# Patient Record
Sex: MALE | Race: WHITE | NOT HISPANIC OR LATINO | Employment: OTHER | RURAL
[De-identification: names, ages, dates, MRNs, and addresses within clinical notes are randomized per-mention and may not be internally consistent; named-entity substitution may affect disease eponyms.]

---

## 2021-11-18 ENCOUNTER — OFFICE VISIT (OUTPATIENT)
Dept: OTOLARYNGOLOGY | Facility: CLINIC | Age: 66
End: 2021-11-18
Payer: MEDICARE

## 2021-11-18 ENCOUNTER — CLINICAL SUPPORT (OUTPATIENT)
Dept: AUDIOLOGY | Facility: CLINIC | Age: 66
End: 2021-11-18
Payer: MEDICARE

## 2021-11-18 VITALS — BODY MASS INDEX: 23.8 KG/M2 | WEIGHT: 170 LBS | HEIGHT: 71 IN

## 2021-11-18 DIAGNOSIS — H90.3 SENSORINEURAL HEARING LOSS (SNHL) OF BOTH EARS: ICD-10-CM

## 2021-11-18 DIAGNOSIS — H65.22 LEFT CHRONIC SEROUS OTITIS MEDIA: ICD-10-CM

## 2021-11-18 DIAGNOSIS — H93.293 ABNORMAL AUDITORY PERCEPTION OF BOTH EARS: Primary | ICD-10-CM

## 2021-11-18 DIAGNOSIS — H61.23 BILATERAL IMPACTED CERUMEN: ICD-10-CM

## 2021-11-18 DIAGNOSIS — H66.93 BILATERAL OTITIS MEDIA, UNSPECIFIED OTITIS MEDIA TYPE: Primary | ICD-10-CM

## 2021-11-18 DIAGNOSIS — H90.72 MIXED CONDUCTIVE AND SENSORINEURAL HEARING LOSS OF LEFT EAR, UNSPECIFIED HEARING STATUS ON CONTRALATERAL SIDE: ICD-10-CM

## 2021-11-18 PROCEDURE — 99204 OFFICE O/P NEW MOD 45 MIN: CPT | Mod: S$PBB,25,, | Performed by: OTOLARYNGOLOGY

## 2021-11-18 PROCEDURE — 99213 OFFICE O/P EST LOW 20 MIN: CPT | Mod: PBBFAC,27 | Performed by: OTOLARYNGOLOGY

## 2021-11-18 PROCEDURE — 99204 PR OFFICE/OUTPT VISIT, NEW, LEVL IV, 45-59 MIN: ICD-10-PCS | Mod: S$PBB,25,, | Performed by: OTOLARYNGOLOGY

## 2021-11-18 PROCEDURE — 69210 REMOVE IMPACTED EAR WAX UNI: CPT | Mod: S$PBB,,, | Performed by: OTOLARYNGOLOGY

## 2021-11-18 PROCEDURE — 69210 REMOVE IMPACTED EAR WAX UNI: CPT | Mod: PBBFAC | Performed by: OTOLARYNGOLOGY

## 2021-11-18 PROCEDURE — 99202 OFFICE O/P NEW SF 15 MIN: CPT | Mod: PBBFAC | Performed by: AUDIOLOGIST

## 2021-11-18 PROCEDURE — 92557 COMPREHENSIVE HEARING TEST: CPT | Mod: 52,PBBFAC | Performed by: AUDIOLOGIST

## 2021-11-18 PROCEDURE — 69210 PR REMOVAL IMPACTED CERUMEN REQUIRING INSTRUMENTATION, UNILATERAL: ICD-10-PCS | Mod: S$PBB,,, | Performed by: OTOLARYNGOLOGY

## 2021-11-18 RX ORDER — AMOXICILLIN AND CLAVULANATE POTASSIUM 500; 125 MG/1; MG/1
1 TABLET, FILM COATED ORAL 2 TIMES DAILY
Qty: 28 TABLET | Refills: 0 | Status: ON HOLD | OUTPATIENT
Start: 2021-11-18 | End: 2022-06-17 | Stop reason: ALTCHOICE

## 2022-04-09 ENCOUNTER — OFFICE VISIT (OUTPATIENT)
Dept: FAMILY MEDICINE | Facility: CLINIC | Age: 67
End: 2022-04-09
Payer: MEDICARE

## 2022-04-09 VITALS
RESPIRATION RATE: 20 BRPM | SYSTOLIC BLOOD PRESSURE: 150 MMHG | TEMPERATURE: 99 F | BODY MASS INDEX: 36 KG/M2 | DIASTOLIC BLOOD PRESSURE: 69 MMHG | HEIGHT: 66 IN | HEART RATE: 85 BPM | OXYGEN SATURATION: 97 % | WEIGHT: 224 LBS

## 2022-04-09 DIAGNOSIS — H65.02 NON-RECURRENT ACUTE SEROUS OTITIS MEDIA OF LEFT EAR: Primary | ICD-10-CM

## 2022-04-09 PROCEDURE — 99203 PR OFFICE/OUTPT VISIT, NEW, LEVL III, 30-44 MIN: ICD-10-PCS | Mod: ,,, | Performed by: FAMILY MEDICINE

## 2022-04-09 PROCEDURE — 99203 OFFICE O/P NEW LOW 30 MIN: CPT | Mod: ,,, | Performed by: FAMILY MEDICINE

## 2022-04-09 RX ORDER — AMOXICILLIN 875 MG/1
875 TABLET, FILM COATED ORAL EVERY 12 HOURS
Qty: 20 TABLET | Refills: 0 | Status: ON HOLD | OUTPATIENT
Start: 2022-04-09 | End: 2022-06-17 | Stop reason: ALTCHOICE

## 2022-04-09 RX ORDER — INSULIN ASPART 100 [IU]/ML
36 INJECTION, SOLUTION INTRAVENOUS; SUBCUTANEOUS DAILY PRN
Status: ON HOLD | COMMUNITY
Start: 2021-05-03 | End: 2022-06-17

## 2022-04-09 RX ORDER — AMOXICILLIN 875 MG/1
875 TABLET, FILM COATED ORAL EVERY 12 HOURS
Qty: 20 TABLET | Refills: 0 | Status: CANCELLED | OUTPATIENT
Start: 2022-04-09

## 2022-04-09 RX ORDER — MONTELUKAST SODIUM 10 MG/1
10 TABLET ORAL DAILY
COMMUNITY
Start: 2022-01-13

## 2022-04-09 RX ORDER — INSULIN ASPART 100 [IU]/ML
INJECTION, SOLUTION INTRAVENOUS; SUBCUTANEOUS 3 TIMES DAILY
COMMUNITY
Start: 2022-03-13

## 2022-04-09 RX ORDER — INSULIN DEGLUDEC 200 U/ML
INJECTION, SOLUTION SUBCUTANEOUS
COMMUNITY
Start: 2022-02-26

## 2022-04-09 RX ORDER — PITAVASTATIN CALCIUM 4.18 MG/1
TABLET, FILM COATED ORAL
COMMUNITY

## 2022-04-09 RX ORDER — PREDNISONE 20 MG/1
TABLET ORAL
Qty: 10 TABLET | Refills: 0 | Status: CANCELLED | OUTPATIENT
Start: 2022-04-09

## 2022-04-09 RX ORDER — CLONIDINE HYDROCHLORIDE 0.2 MG/1
0.2 TABLET ORAL 2 TIMES DAILY PRN
COMMUNITY
Start: 2021-12-30

## 2022-04-09 RX ORDER — ALBUTEROL SULFATE 1.25 MG/3ML
SOLUTION RESPIRATORY (INHALATION)
COMMUNITY
Start: 2021-11-12 | End: 2024-01-13 | Stop reason: CLARIF

## 2022-04-09 RX ORDER — GABAPENTIN 300 MG/1
300 CAPSULE ORAL 2 TIMES DAILY
COMMUNITY
Start: 2022-03-13

## 2022-04-09 RX ORDER — BLOOD SUGAR DIAGNOSTIC
STRIP MISCELLANEOUS
COMMUNITY
Start: 2022-01-03

## 2022-04-09 RX ORDER — HYDRALAZINE HYDROCHLORIDE 50 MG/1
TABLET, FILM COATED ORAL
COMMUNITY

## 2022-04-09 RX ORDER — PREDNISONE 20 MG/1
TABLET ORAL
Qty: 10 TABLET | Refills: 0 | Status: ON HOLD | OUTPATIENT
Start: 2022-04-09 | End: 2022-06-17 | Stop reason: ALTCHOICE

## 2022-04-09 RX ORDER — CARVEDILOL 25 MG/1
25 TABLET ORAL 2 TIMES DAILY
COMMUNITY
Start: 2021-12-02

## 2022-04-09 RX ORDER — CLOPIDOGREL BISULFATE 75 MG/1
75 TABLET ORAL DAILY
COMMUNITY
Start: 2022-03-13

## 2022-04-09 RX ORDER — NITROGLYCERIN 0.4 MG/1
TABLET SUBLINGUAL
COMMUNITY

## 2022-04-09 NOTE — PROGRESS NOTES
Subjective:       Patient ID: Isauro Spivey is a 66 y.o. male.    Chief Complaint: Sinus Problem (Sinus congestion, sore throat, ear discomfort)    No fever.  Left ear feels stopped up.    Review of Systems      Objective:      Physical Exam  Constitutional:       Appearance: Normal appearance. He is not ill-appearing.   HENT:      Right Ear: Tympanic membrane normal.      Left Ear: A middle ear effusion is present. Tympanic membrane is bulging. Tympanic membrane is not injected.      Nose: Congestion and rhinorrhea present.      Mouth/Throat:      Pharynx: No posterior oropharyngeal erythema.   Cardiovascular:      Rate and Rhythm: Normal rate and regular rhythm.   Pulmonary:      Effort: Pulmonary effort is normal.      Breath sounds: Normal breath sounds.   Neurological:      Mental Status: He is alert.         Assessment:       Problem List Items Addressed This Visit    None     Visit Diagnoses     Non-recurrent acute serous otitis media of left ear    -  Primary          Plan:         better in 2 days

## 2022-06-09 ENCOUNTER — TELEPHONE (OUTPATIENT)
Dept: OTOLARYNGOLOGY | Facility: CLINIC | Age: 67
End: 2022-06-09
Payer: MEDICARE

## 2022-06-09 ENCOUNTER — OFFICE VISIT (OUTPATIENT)
Dept: OTOLARYNGOLOGY | Facility: CLINIC | Age: 67
End: 2022-06-09
Payer: MEDICARE

## 2022-06-09 VITALS — HEIGHT: 66 IN | WEIGHT: 224 LBS | BODY MASS INDEX: 36 KG/M2

## 2022-06-09 DIAGNOSIS — H65.21 RIGHT CHRONIC SEROUS OTITIS MEDIA: ICD-10-CM

## 2022-06-09 DIAGNOSIS — H90.3 SENSORINEURAL HEARING LOSS (SNHL) OF BOTH EARS: Primary | ICD-10-CM

## 2022-06-09 PROCEDURE — 99214 OFFICE O/P EST MOD 30 MIN: CPT | Mod: S$PBB,,, | Performed by: OTOLARYNGOLOGY

## 2022-06-09 PROCEDURE — 99215 OFFICE O/P EST HI 40 MIN: CPT | Mod: PBBFAC | Performed by: OTOLARYNGOLOGY

## 2022-06-09 PROCEDURE — 99214 PR OFFICE/OUTPT VISIT, EST, LEVL IV, 30-39 MIN: ICD-10-PCS | Mod: S$PBB,,, | Performed by: OTOLARYNGOLOGY

## 2022-06-09 NOTE — H&P (VIEW-ONLY)
Subjective:       Patient ID: Isauro Spivey is a 66 y.o. male.    Chief Complaint: Ear Fullness (Right ear fullness. Pt states right ear opened up and then feels like it closed again, states he has decreased hearing in right ear. ) and Hearing Loss (Decreased hearing. )  Fluid for > 3 months  HPI  Review of Systems   HENT: Positive for ear pain and hearing loss.    All other systems reviewed and are negative.      Objective:      Physical Exam  General: NAD  Head: Normocephalic, atraumatic, no facial asymmetry/normal strength,  Ears: Both auricules normal in appearance, w/o deformities tympanic membranes fluid right  external auditory canals normal  Nose: External nose w/o deformities normal turbinates no drainage or inflammation  Oral Cavity: Lips, gums, floor of mouth, tongue hard palate, and buccal mucosa without mass/lesion  Oropharynx: Mucosa pink and moist, soft palate, posterior pharynx and oropharyngeal wall without mass/lesion  Neck: Supple, symmetric, trachea midline, no palpable mass/lesion, no palpable cervical lymphadenopathy  Skin: Warm and dry, no concerning lesions  Respiratory: Respirations even, unlabored    Assessment:       1. Sensorineural hearing loss (SNHL) of both ears    2. Right chronic serous otitis media        Plan:       Right tube in OR

## 2022-06-17 ENCOUNTER — ANESTHESIA (OUTPATIENT)
Dept: SURGERY | Facility: HOSPITAL | Age: 67
End: 2022-06-17
Payer: MEDICARE

## 2022-06-17 ENCOUNTER — ANESTHESIA EVENT (OUTPATIENT)
Dept: SURGERY | Facility: HOSPITAL | Age: 67
End: 2022-06-17
Payer: MEDICARE

## 2022-06-17 ENCOUNTER — HOSPITAL ENCOUNTER (OUTPATIENT)
Facility: HOSPITAL | Age: 67
Discharge: HOME | End: 2022-06-17
Attending: OTOLARYNGOLOGY | Admitting: OTOLARYNGOLOGY
Payer: MEDICARE

## 2022-06-17 VITALS
HEART RATE: 68 BPM | HEIGHT: 69 IN | WEIGHT: 232 LBS | DIASTOLIC BLOOD PRESSURE: 68 MMHG | OXYGEN SATURATION: 97 % | RESPIRATION RATE: 17 BRPM | TEMPERATURE: 98 F | BODY MASS INDEX: 34.36 KG/M2 | SYSTOLIC BLOOD PRESSURE: 161 MMHG

## 2022-06-17 DIAGNOSIS — H65.21 RIGHT CHRONIC SEROUS OTITIS MEDIA: Primary | ICD-10-CM

## 2022-06-17 DIAGNOSIS — H66.91 CHRONIC OTITIS MEDIA OF RIGHT EAR: ICD-10-CM

## 2022-06-17 LAB
GLUCOSE SERPL-MCNC: 169 MG/DL (ref 70–105)
GLUCOSE SERPL-MCNC: 214 MG/DL (ref 70–105)

## 2022-06-17 PROCEDURE — 71000015 HC POSTOP RECOV 1ST HR: Performed by: OTOLARYNGOLOGY

## 2022-06-17 PROCEDURE — D9220A PRA ANESTHESIA: ICD-10-PCS | Mod: ANES,,, | Performed by: ANESTHESIOLOGY

## 2022-06-17 PROCEDURE — D9220A PRA ANESTHESIA: ICD-10-PCS | Mod: CRNA,,, | Performed by: NURSE ANESTHETIST, CERTIFIED REGISTERED

## 2022-06-17 PROCEDURE — 37000009 HC ANESTHESIA EA ADD 15 MINS: Performed by: OTOLARYNGOLOGY

## 2022-06-17 PROCEDURE — 27000510 HC BLANKET BAIR HUGGER ANY SIZE: Performed by: ANESTHESIOLOGY

## 2022-06-17 PROCEDURE — D9220A PRA ANESTHESIA: Mod: ANES,,, | Performed by: ANESTHESIOLOGY

## 2022-06-17 PROCEDURE — 37000008 HC ANESTHESIA 1ST 15 MINUTES: Performed by: OTOLARYNGOLOGY

## 2022-06-17 PROCEDURE — 27000177 HC AIRWAY, LARYNGEAL MASK: Performed by: ANESTHESIOLOGY

## 2022-06-17 PROCEDURE — 63600175 PHARM REV CODE 636 W HCPCS: Performed by: NURSE ANESTHETIST, CERTIFIED REGISTERED

## 2022-06-17 PROCEDURE — 27800903 OPTIME MED/SURG SUP & DEVICES OTHER IMPLANTS: Performed by: OTOLARYNGOLOGY

## 2022-06-17 PROCEDURE — 27000716 HC OXISENSOR PROBE, ANY SIZE: Performed by: ANESTHESIOLOGY

## 2022-06-17 PROCEDURE — 27000655: Performed by: ANESTHESIOLOGY

## 2022-06-17 PROCEDURE — 82962 GLUCOSE BLOOD TEST: CPT | Mod: 91

## 2022-06-17 PROCEDURE — 25000003 PHARM REV CODE 250: Performed by: NURSE ANESTHETIST, CERTIFIED REGISTERED

## 2022-06-17 PROCEDURE — 71000033 HC RECOVERY, INTIAL HOUR: Performed by: OTOLARYNGOLOGY

## 2022-06-17 PROCEDURE — 69436 CREATE EARDRUM OPENING: CPT | Mod: RT,,, | Performed by: OTOLARYNGOLOGY

## 2022-06-17 PROCEDURE — 36000704 HC OR TIME LEV I 1ST 15 MIN: Performed by: OTOLARYNGOLOGY

## 2022-06-17 PROCEDURE — D9220A PRA ANESTHESIA: Mod: CRNA,,, | Performed by: NURSE ANESTHETIST, CERTIFIED REGISTERED

## 2022-06-17 PROCEDURE — 27201423 OPTIME MED/SURG SUP & DEVICES STERILE SUPPLY: Performed by: OTOLARYNGOLOGY

## 2022-06-17 PROCEDURE — 27000284 HC CANNULA NASAL: Performed by: ANESTHESIOLOGY

## 2022-06-17 PROCEDURE — 69436 PR CREATE EARDRUM OPENING,GEN ANESTH: ICD-10-PCS | Mod: RT,,, | Performed by: OTOLARYNGOLOGY

## 2022-06-17 RX ORDER — LIDOCAINE HYDROCHLORIDE 20 MG/ML
INJECTION, SOLUTION EPIDURAL; INFILTRATION; INTRACAUDAL; PERINEURAL
Status: DISCONTINUED | OUTPATIENT
Start: 2022-06-17 | End: 2022-06-17

## 2022-06-17 RX ORDER — OXYCODONE HYDROCHLORIDE 5 MG/1
5 TABLET ORAL
Status: CANCELLED | OUTPATIENT
Start: 2022-06-17

## 2022-06-17 RX ORDER — SODIUM CHLORIDE, SODIUM LACTATE, POTASSIUM CHLORIDE, CALCIUM CHLORIDE 600; 310; 30; 20 MG/100ML; MG/100ML; MG/100ML; MG/100ML
125 INJECTION, SOLUTION INTRAVENOUS CONTINUOUS
Status: CANCELLED | OUTPATIENT
Start: 2022-06-17

## 2022-06-17 RX ORDER — ONDANSETRON 2 MG/ML
INJECTION INTRAMUSCULAR; INTRAVENOUS
Status: DISCONTINUED | OUTPATIENT
Start: 2022-06-17 | End: 2022-06-17

## 2022-06-17 RX ORDER — MIDAZOLAM HYDROCHLORIDE 5 MG/ML
INJECTION INTRAMUSCULAR; INTRAVENOUS
Status: DISCONTINUED | OUTPATIENT
Start: 2022-06-17 | End: 2022-06-17

## 2022-06-17 RX ORDER — ONDANSETRON 2 MG/ML
4 INJECTION INTRAMUSCULAR; INTRAVENOUS DAILY PRN
Status: CANCELLED | OUTPATIENT
Start: 2022-06-17

## 2022-06-17 RX ORDER — HYDROMORPHONE HYDROCHLORIDE 2 MG/ML
0.5 INJECTION, SOLUTION INTRAMUSCULAR; INTRAVENOUS; SUBCUTANEOUS EVERY 5 MIN PRN
Status: CANCELLED | OUTPATIENT
Start: 2022-06-17

## 2022-06-17 RX ORDER — DIPHENHYDRAMINE HYDROCHLORIDE 50 MG/ML
25 INJECTION INTRAMUSCULAR; INTRAVENOUS EVERY 6 HOURS PRN
Status: CANCELLED | OUTPATIENT
Start: 2022-06-17

## 2022-06-17 RX ORDER — MORPHINE SULFATE 8 MG/ML
4 INJECTION INTRAMUSCULAR; INTRAVENOUS; SUBCUTANEOUS EVERY 5 MIN PRN
Status: CANCELLED | OUTPATIENT
Start: 2022-06-17

## 2022-06-17 RX ORDER — PROPOFOL 10 MG/ML
VIAL (ML) INTRAVENOUS
Status: DISCONTINUED | OUTPATIENT
Start: 2022-06-17 | End: 2022-06-17

## 2022-06-17 RX ADMIN — SODIUM CHLORIDE, POTASSIUM CHLORIDE, SODIUM LACTATE AND CALCIUM CHLORIDE: 600; 310; 30; 20 INJECTION, SOLUTION INTRAVENOUS at 10:06

## 2022-06-17 RX ADMIN — ONDANSETRON 8 MG: 2 INJECTION INTRAMUSCULAR; INTRAVENOUS at 10:06

## 2022-06-17 RX ADMIN — MIDAZOLAM HYDROCHLORIDE 2 MG: 5 INJECTION, SOLUTION INTRAMUSCULAR; INTRAVENOUS at 10:06

## 2022-06-17 RX ADMIN — PROPOFOL 150 MG: 10 INJECTION, EMULSION INTRAVENOUS at 10:06

## 2022-06-17 RX ADMIN — LIDOCAINE HYDROCHLORIDE 100 MG: 20 INJECTION, SOLUTION EPIDURAL; INFILTRATION; INTRACAUDAL; PERINEURAL at 10:06

## 2022-06-17 NOTE — ANESTHESIA PREPROCEDURE EVALUATION
06/17/2022  Isauro Spivey is a 66 y.o., male.      Pre-op Assessment    I have reviewed the Patient Summary Reports.     I have reviewed the Nursing Notes. I have reviewed the NPO Status.   I have reviewed the Medications.     Review of Systems  Anesthesia Hx:  No problems with previous Anesthesia    Social:  Non-Smoker, No Alcohol Use    Hematology/Oncology:  Hematology Normal   Oncology Normal   Hematology Comments: Anticoagulated s/p stent    EENT/Dental:EENT/Dental Normal   Cardiovascular:   Hypertension Past MI CAD   Hx MI / CORONARY STENT (stable)   Pulmonary:  Pulmonary Normal    Renal/:  Renal/ Normal     Hepatic/GI:  Hepatic/GI Normal    Musculoskeletal:  Musculoskeletal Normal    Neurological:  Neurology Normal    Endocrine:   Diabetes, well controlled, type 2  Obesity / BMI > 30  Dermatological:  Skin Normal    Psych:  Psychiatric Normal           Physical Exam  General: Well nourished    Airway:  Mallampati: III / III  Mouth Opening: Normal  TM Distance: > 6 cm  Tongue: Normal  Neck ROM: Normal ROM    Chest/Lungs:  Clear to auscultation, Normal Respiratory Rate    Heart:  Rate: Normal  Rhythm: Regular Rhythm        Anesthesia Plan  Type of Anesthesia, risks & benefits discussed:    Anesthesia Type: Gen Supraglottic Airway  Intra-op Monitoring Plan: Standard ASA Monitors  Post Op Pain Control Plan: multimodal analgesia  Induction:  IV  Informed Consent: Informed consent signed with the Patient and all parties understand the risks and agree with anesthesia plan.  All questions answered.   ASA Score: 4  Day of Surgery Review of History & Physical: H&P Update referred to the surgeon/provider.I have interviewed and examined the patient. I have reviewed the patient's H&P dated: There are no significant changes. H&P completed by Anesthesiologist.    Ready For Surgery From Anesthesia Perspective.      .

## 2022-06-17 NOTE — TRANSFER OF CARE
"Anesthesia Transfer of Care Note    Patient: Isauro Spivey    Procedure(s) Performed: Procedure(s) (LRB):  RIGHT MYRINGOTOMY, WITH TYMPANOSTOMY TUBE INSERTION (Right)    Patient location: PACU    Anesthesia Type: general    Transport from OR: Transported from OR on 100% O2 by closed face mask with adequate spontaneous ventilation    Post pain: adequate analgesia    Post assessment: no apparent anesthetic complications    Post vital signs: stable    Level of consciousness: responds to stimulation    Nausea/Vomiting: no nausea/vomiting    Complications: none    Transfer of care protocol was followedComments: Accu check-169      Last vitals:   Visit Vitals  BP (!) 124/53 (BP Location: Left arm, Patient Position: Lying)   Pulse 70   Temp 36.7 °C (98.1 °F) (Oral)   Resp (!) 22   Ht 5' 9" (1.753 m)   Wt 105.2 kg (232 lb)   SpO2 100%   BMI 34.26 kg/m²     "

## 2022-06-17 NOTE — OP NOTE
Pre-Op Diagnosis :Chronic recurrent serous otitis media    Post op Diagnosis: Same     After general mask anesthesia using the operating microscope the right ear was examined and a myringotomy was performed in the anterior inferior quadrant and a  tube was placed without difficulty excess middle ear  fluid was suctioned. the patient tolerated the procedure well and was reversed taken to RR in stable condition

## 2022-06-28 ENCOUNTER — OFFICE VISIT (OUTPATIENT)
Dept: OTOLARYNGOLOGY | Facility: CLINIC | Age: 67
End: 2022-06-28
Payer: MEDICARE

## 2022-06-28 VITALS — BODY MASS INDEX: 34.36 KG/M2 | WEIGHT: 232 LBS | HEIGHT: 69 IN

## 2022-06-28 DIAGNOSIS — H92.11 OTORRHEA, RIGHT EAR: ICD-10-CM

## 2022-06-28 DIAGNOSIS — H65.21 RIGHT CHRONIC SEROUS OTITIS MEDIA: ICD-10-CM

## 2022-06-28 DIAGNOSIS — H66.91 RIGHT OTITIS MEDIA, UNSPECIFIED OTITIS MEDIA TYPE: Primary | ICD-10-CM

## 2022-06-28 DIAGNOSIS — H90.3 SENSORINEURAL HEARING LOSS (SNHL) OF BOTH EARS: ICD-10-CM

## 2022-06-28 PROCEDURE — 99213 OFFICE O/P EST LOW 20 MIN: CPT | Mod: S$PBB,,, | Performed by: OTOLARYNGOLOGY

## 2022-06-28 PROCEDURE — 99213 OFFICE O/P EST LOW 20 MIN: CPT | Mod: PBBFAC | Performed by: OTOLARYNGOLOGY

## 2022-06-28 PROCEDURE — 99213 PR OFFICE/OUTPT VISIT, EST, LEVL III, 20-29 MIN: ICD-10-PCS | Mod: S$PBB,,, | Performed by: OTOLARYNGOLOGY

## 2022-06-28 RX ORDER — CIPROFLOXACIN AND DEXAMETHASONE 3; 1 MG/ML; MG/ML
3 SUSPENSION/ DROPS AURICULAR (OTIC) 2 TIMES DAILY
Qty: 7.5 ML | Refills: 0 | Status: SHIPPED | OUTPATIENT
Start: 2022-06-28 | End: 2022-08-31 | Stop reason: ALTCHOICE

## 2022-06-28 NOTE — PROGRESS NOTES
Subjective:       Patient ID: Isauro Spivey is a 66 y.o. male.    Chief Complaint: Follow-up (Patient is a follow up for a right pe tube. He states he has has constant drainage and it has a smell to it.)    HPI  Review of Systems   HENT: Positive for ear discharge and ear pain.    All other systems reviewed and are negative.      Objective:      Physical Exam  General: NAD  Head: Normocephalic, atraumatic, no facial asymmetry/normal strength,  Ears: Both auricules normal in appearance, w/o deformities tympanic membranes tube open on right clear drainage external auditory canals normal  Nose: External nose w/o deformities normal turbinates no drainage or inflammation  Oral Cavity: Lips, gums, floor of mouth, tongue hard palate, and buccal mucosa without mass/lesion  Oropharynx: Mucosa pink and moist, soft palate, posterior pharynx and oropharyngeal wall without mass/lesion  Neck: Supple, symmetric, trachea midline, no palpable mass/lesion, no palpable cervical lymphadenopathy  Skin: Warm and dry, no concerning lesions  Respiratory: Respirations even, unlabored    Assessment:       1. Right otitis media, unspecified otitis media type    2. Sensorineural hearing loss (SNHL) of both ears    3. Right chronic serous otitis media    4. Otorrhea, right ear        Plan:       Ciprodex

## 2022-07-18 ENCOUNTER — TELEPHONE (OUTPATIENT)
Dept: OTOLARYNGOLOGY | Facility: CLINIC | Age: 67
End: 2022-07-18
Payer: MEDICARE

## 2022-07-18 DIAGNOSIS — H66.91 CHRONIC OTITIS MEDIA OF RIGHT EAR: Primary | ICD-10-CM

## 2022-07-18 RX ORDER — CLINDAMYCIN HYDROCHLORIDE 300 MG/1
300 CAPSULE ORAL 2 TIMES DAILY
Qty: 28 CAPSULE | Refills: 0 | Status: SHIPPED | OUTPATIENT
Start: 2022-07-18 | End: 2022-08-31 | Stop reason: ALTCHOICE

## 2022-08-01 ENCOUNTER — TELEPHONE (OUTPATIENT)
Dept: OTOLARYNGOLOGY | Facility: CLINIC | Age: 67
End: 2022-08-01
Payer: MEDICARE

## 2022-08-01 DIAGNOSIS — H66.93 BILATERAL OTITIS MEDIA, UNSPECIFIED OTITIS MEDIA TYPE: Primary | ICD-10-CM

## 2022-08-01 RX ORDER — OFLOXACIN 3 MG/ML
3 SOLUTION AURICULAR (OTIC) 2 TIMES DAILY
Qty: 10 ML | Refills: 0 | Status: SHIPPED | OUTPATIENT
Start: 2022-08-01 | End: 2022-08-31 | Stop reason: ALTCHOICE

## 2022-08-01 RX ORDER — LEVOFLOXACIN 500 MG/1
500 TABLET, FILM COATED ORAL DAILY
Qty: 10 TABLET | Refills: 0 | Status: SHIPPED | OUTPATIENT
Start: 2022-08-01 | End: 2022-10-21

## 2022-08-31 ENCOUNTER — OFFICE VISIT (OUTPATIENT)
Dept: OTOLARYNGOLOGY | Facility: CLINIC | Age: 67
End: 2022-08-31
Payer: MEDICARE

## 2022-08-31 VITALS — HEIGHT: 69 IN | WEIGHT: 232 LBS | BODY MASS INDEX: 34.36 KG/M2

## 2022-08-31 DIAGNOSIS — H60.91 OTITIS EXTERNA OF RIGHT EAR, UNSPECIFIED CHRONICITY, UNSPECIFIED TYPE: ICD-10-CM

## 2022-08-31 DIAGNOSIS — H65.21 RIGHT CHRONIC SEROUS OTITIS MEDIA: Primary | ICD-10-CM

## 2022-08-31 PROCEDURE — 99213 OFFICE O/P EST LOW 20 MIN: CPT | Mod: PBBFAC | Performed by: OTOLARYNGOLOGY

## 2022-08-31 PROCEDURE — 69210 REMOVE IMPACTED EAR WAX UNI: CPT | Mod: S$PBB,,, | Performed by: OTOLARYNGOLOGY

## 2022-08-31 PROCEDURE — 99214 OFFICE O/P EST MOD 30 MIN: CPT | Mod: S$PBB,25,, | Performed by: OTOLARYNGOLOGY

## 2022-08-31 PROCEDURE — 69210 REMOVE IMPACTED EAR WAX UNI: CPT | Mod: PBBFAC | Performed by: OTOLARYNGOLOGY

## 2022-08-31 PROCEDURE — 99214 PR OFFICE/OUTPT VISIT, EST, LEVL IV, 30-39 MIN: ICD-10-PCS | Mod: S$PBB,25,, | Performed by: OTOLARYNGOLOGY

## 2022-08-31 PROCEDURE — 69210 PR REMOVAL IMPACTED CERUMEN REQUIRING INSTRUMENTATION, UNILATERAL: ICD-10-PCS | Mod: S$PBB,,, | Performed by: OTOLARYNGOLOGY

## 2022-08-31 RX ORDER — CLINDAMYCIN HYDROCHLORIDE 300 MG/1
300 CAPSULE ORAL 2 TIMES DAILY
Qty: 28 CAPSULE | Refills: 0 | Status: SHIPPED | OUTPATIENT
Start: 2022-08-31 | End: 2024-01-13 | Stop reason: CLARIF

## 2022-08-31 RX ORDER — NEOMYCIN SULFATE, POLYMYXIN B SULFATE AND HYDROCORTISONE 10; 3.5; 1 MG/ML; MG/ML; [USP'U]/ML
3 SUSPENSION/ DROPS AURICULAR (OTIC) 3 TIMES DAILY
Qty: 10 ML | Refills: 0 | Status: SHIPPED | OUTPATIENT
Start: 2022-08-31 | End: 2022-09-21 | Stop reason: ALTCHOICE

## 2022-08-31 NOTE — PROGRESS NOTES
Subjective:       Patient ID: Isauro Spivey is a 67 y.o. male.    Chief Complaint: Ear Drainage (Patient presents today for right ear drainage. He notices it mostly at night on his pillow.)    HPI  Review of Systems   Constitutional:  Negative for chills, fatigue and fever.   HENT:  Positive for ear discharge. Negative for ear pain and hearing loss.    All other systems reviewed and are negative.      Objective:      Physical Exam  Constitutional:       Appearance: Normal appearance. He is obese.   HENT:      Head: Normocephalic.      Right Ear: Ear canal and external ear normal. Drainage present. A PE tube is present.      Left Ear: Tympanic membrane, ear canal and external ear normal.      Nose: Nose normal.      Mouth/Throat:      Lips: Pink.      Mouth: Mucous membranes are moist.      Pharynx: Oropharynx is clear.   Eyes:      General: Lids are normal. Lids are everted, no foreign bodies appreciated.   Pulmonary:      Effort: Pulmonary effort is normal.   Skin:     General: Skin is warm and dry.   Neurological:      Mental Status: He is alert and oriented to person, place, and time.   Psychiatric:         Mood and Affect: Mood normal.         Behavior: Behavior normal. Behavior is cooperative.     Procedure:   Binocular microscopy with removal of cerumen impaction using microsurgical instrumentation.  After explaining the procedure and obtaining verbal assent, right external auditory canal visualized with the 250 mm focal length lens through the operating microscope. The obstructing cerumen and debris was removed with microsurgical instrumentation to reveal normal and healthy external auditory canals. The patient tolerated this procedure well without complication.     Assessment:       Problem List Items Addressed This Visit    None  Visit Diagnoses       Right chronic serous otitis media    -  Primary    Relevant Medications    clindamycin (CLEOCIN) 300 MG capsule    neomycin-polymyxin-hydrocortisone  (CORTISPORIN) 3.5-10,000-1 mg/mL-unit/mL-% otic suspension    Otitis externa of right ear, unspecified chronicity, unspecified type                  Plan:   CSP  Cleocin  Follow up in 3 weeks.

## 2022-09-21 ENCOUNTER — OFFICE VISIT (OUTPATIENT)
Dept: OTOLARYNGOLOGY | Facility: CLINIC | Age: 67
End: 2022-09-21
Payer: MEDICARE

## 2022-09-21 VITALS — BODY MASS INDEX: 34.36 KG/M2 | HEIGHT: 69 IN | WEIGHT: 232 LBS

## 2022-09-21 DIAGNOSIS — H60.91 OTITIS EXTERNA OF RIGHT EAR, UNSPECIFIED CHRONICITY, UNSPECIFIED TYPE: Primary | ICD-10-CM

## 2022-09-21 DIAGNOSIS — H92.11 OTORRHEA, RIGHT EAR: ICD-10-CM

## 2022-09-21 PROCEDURE — 99214 OFFICE O/P EST MOD 30 MIN: CPT | Mod: S$PBB,,, | Performed by: OTOLARYNGOLOGY

## 2022-09-21 PROCEDURE — 99214 PR OFFICE/OUTPT VISIT, EST, LEVL IV, 30-39 MIN: ICD-10-PCS | Mod: S$PBB,,, | Performed by: OTOLARYNGOLOGY

## 2022-09-21 PROCEDURE — 99213 OFFICE O/P EST LOW 20 MIN: CPT | Mod: PBBFAC | Performed by: OTOLARYNGOLOGY

## 2022-09-21 RX ORDER — OFLOXACIN 3 MG/ML
5 SOLUTION AURICULAR (OTIC) DAILY
Qty: 10 ML | Refills: 2 | Status: SHIPPED | OUTPATIENT
Start: 2022-09-21 | End: 2022-10-21 | Stop reason: ALTCHOICE

## 2022-09-21 NOTE — PROGRESS NOTES
Subjective:       Patient ID: Isauro Spivey is a 67 y.o. male.    Chief Complaint: Ear Drainage (Patient presents to clinic for right ear drainage. )    HPI  Review of Systems   Constitutional:  Negative for chills, fatigue and fever.   HENT:  Positive for ear discharge, ear pain and hearing loss.    All other systems reviewed and are negative.      Objective:      Physical Exam  Constitutional:       Appearance: Normal appearance.   HENT:      Head: Normocephalic.      Right Ear: Tympanic membrane and external ear normal. A PE tube is present.      Left Ear: Tympanic membrane, ear canal and external ear normal.      Ears:      Comments: Drainage improved to right ear.     Nose: Nose normal.      Mouth/Throat:      Lips: Pink.      Mouth: Mucous membranes are moist.      Pharynx: Oropharynx is clear.   Eyes:      General: Lids are normal. Lids are everted, no foreign bodies appreciated.   Pulmonary:      Effort: Pulmonary effort is normal.   Musculoskeletal:      Cervical back: Normal range of motion.   Skin:     General: Skin is warm and dry.   Neurological:      Mental Status: He is alert.   Psychiatric:         Mood and Affect: Mood normal.         Behavior: Behavior is cooperative.       Assessment:       Problem List Items Addressed This Visit    None  Visit Diagnoses       Otitis externa of right ear, unspecified chronicity, unspecified type    -  Primary    Relevant Medications    ofloxacin (FLOXIN) 0.3 % otic solution    Otorrhea, right ear                  Plan:   Ofloxacin to right ear.  Follow up in 3 mos

## 2022-10-21 ENCOUNTER — OFFICE VISIT (OUTPATIENT)
Dept: OTOLARYNGOLOGY | Facility: CLINIC | Age: 67
End: 2022-10-21
Payer: MEDICARE

## 2022-10-21 VITALS — WEIGHT: 232 LBS | HEIGHT: 69 IN | BODY MASS INDEX: 34.36 KG/M2

## 2022-10-21 DIAGNOSIS — H65.21 RIGHT CHRONIC SEROUS OTITIS MEDIA: ICD-10-CM

## 2022-10-21 DIAGNOSIS — H60.91 OTITIS EXTERNA OF RIGHT EAR, UNSPECIFIED CHRONICITY, UNSPECIFIED TYPE: Primary | ICD-10-CM

## 2022-10-21 PROCEDURE — 99214 PR OFFICE/OUTPT VISIT, EST, LEVL IV, 30-39 MIN: ICD-10-PCS | Mod: S$PBB,,, | Performed by: OTOLARYNGOLOGY

## 2022-10-21 PROCEDURE — 99213 OFFICE O/P EST LOW 20 MIN: CPT | Mod: PBBFAC | Performed by: OTOLARYNGOLOGY

## 2022-10-21 PROCEDURE — 99214 OFFICE O/P EST MOD 30 MIN: CPT | Mod: S$PBB,,, | Performed by: OTOLARYNGOLOGY

## 2022-10-21 RX ORDER — CIPROFLOXACIN AND DEXAMETHASONE 3; 1 MG/ML; MG/ML
4 SUSPENSION/ DROPS AURICULAR (OTIC) 2 TIMES DAILY
Qty: 7.5 ML | Refills: 1 | Status: SHIPPED | OUTPATIENT
Start: 2022-10-21 | End: 2024-01-13 | Stop reason: CLARIF

## 2022-10-21 RX ORDER — LEVOFLOXACIN 500 MG/1
500 TABLET, FILM COATED ORAL DAILY
Qty: 10 TABLET | Refills: 0 | Status: SHIPPED | OUTPATIENT
Start: 2022-10-21 | End: 2024-01-13 | Stop reason: CLARIF

## 2022-10-21 NOTE — PROGRESS NOTES
Subjective:       Patient ID: Isauro Spivey is a 67 y.o. male.    Chief Complaint: Ear Drainage (Right ear continues to drain thick foul drainage.)    HPI  Review of Systems   Constitutional:  Negative for chills, fatigue and fever.   HENT:  Positive for ear discharge and hearing loss. Negative for ear pain.    All other systems reviewed and are negative.      Objective:      Physical Exam  Constitutional:       Appearance: Normal appearance.   HENT:      Head: Normocephalic.      Right Ear: External ear normal. Drainage present. A middle ear effusion is present. A PE tube is present.      Left Ear: Tympanic membrane, ear canal and external ear normal.      Nose: Nose normal.      Mouth/Throat:      Mouth: Mucous membranes are moist.      Pharynx: Oropharynx is clear.   Eyes:      Pupils: Pupils are equal, round, and reactive to light.   Pulmonary:      Effort: Pulmonary effort is normal.   Skin:     General: Skin is warm and dry.   Neurological:      Mental Status: He is alert and oriented to person, place, and time.   Psychiatric:         Mood and Affect: Mood normal.         Behavior: Behavior is cooperative.       Procedure:   Binocular microscopy with removal of thick mucous using microsurgical instrumentation.  After explaining the procedure and obtaining verbal assent,  right external auditory canal visualized with the 250 mm focal length lens through the operating microscope. The obstructing drainage was removed with microsurgical instrumentation to reveal normal and healthy external auditory canals. The patient tolerated this procedure well without complication.     Assessment:       Problem List Items Addressed This Visit    None  Visit Diagnoses       Otitis externa of right ear, unspecified chronicity, unspecified type    -  Primary    Relevant Medications    ciprofloxacin-dexAMETHasone 0.3-0.1% (CIPRODEX) 0.3-0.1 % DrpS    Right chronic serous otitis media        Relevant Medications    levoFLOXacin  (LEVAQUIN) 500 MG tablet              Plan:   Levaquin/Ciprodex  Follow up in 2 weeks.  May have to remove tube

## 2023-05-10 ENCOUNTER — HOSPITAL ENCOUNTER (EMERGENCY)
Facility: HOSPITAL | Age: 68
Discharge: HOME | End: 2023-05-10
Payer: MEDICARE

## 2023-05-10 VITALS
BODY MASS INDEX: 35.61 KG/M2 | HEIGHT: 68 IN | WEIGHT: 235 LBS | HEART RATE: 72 BPM | RESPIRATION RATE: 18 BRPM | DIASTOLIC BLOOD PRESSURE: 95 MMHG | SYSTOLIC BLOOD PRESSURE: 198 MMHG | TEMPERATURE: 98 F | OXYGEN SATURATION: 98 %

## 2023-05-10 DIAGNOSIS — E11.621 DIABETIC ULCER OF RIGHT GREAT TOE: ICD-10-CM

## 2023-05-10 DIAGNOSIS — S80.812A ABRASION OF LEFT CALF, INITIAL ENCOUNTER: ICD-10-CM

## 2023-05-10 DIAGNOSIS — L97.519 DIABETIC ULCER OF RIGHT GREAT TOE: ICD-10-CM

## 2023-05-10 DIAGNOSIS — L97.529 DIABETIC ULCER OF LEFT GREAT TOE: ICD-10-CM

## 2023-05-10 DIAGNOSIS — S80.12XA CONTUSION OF LEFT CALF, INITIAL ENCOUNTER: ICD-10-CM

## 2023-05-10 DIAGNOSIS — E11.621 DIABETIC ULCER OF LEFT GREAT TOE: ICD-10-CM
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
## 2023-05-10 PROCEDURE — 99284 EMERGENCY DEPT VISIT MOD MDM: CPT | Mod: GF

## 2023-05-10 PROCEDURE — 63600175 PHARM REV CODE 636 W HCPCS: Performed by: NURSE PRACTITIONER

## 2023-05-10 PROCEDURE — 99284 EMERGENCY DEPT VISIT MOD MDM: CPT | Mod: 25

## 2023-05-10 PROCEDURE — 90471 IMMUNIZATION ADMIN: CPT | Performed by: NURSE PRACTITIONER

## 2023-05-10 PROCEDURE — 90715 TDAP VACCINE 7 YRS/> IM: CPT | Performed by: NURSE PRACTITIONER

## 2023-05-10 RX ORDER — PRAZOSIN HYDROCHLORIDE 5 MG/1
5 CAPSULE ORAL 2 TIMES DAILY
COMMUNITY

## 2023-05-10 RX ORDER — CEPHALEXIN 500 MG/1
500 CAPSULE ORAL EVERY 8 HOURS
Qty: 30 CAPSULE | Refills: 0 | Status: SHIPPED | OUTPATIENT
Start: 2023-05-10 | End: 2023-05-20

## 2023-05-10 RX ADMIN — TETANUS TOXOID, REDUCED DIPHTHERIA TOXOID AND ACELLULAR PERTUSSIS VACCINE, ADSORBED 0.5 ML: 5; 2.5; 8; 8; 2.5 SUSPENSION INTRAMUSCULAR at 06:05

## 2023-05-10 NOTE — ED TRIAGE NOTES
Stepped in a rotten spot in a floor when his leg went through the area he sustained an abrasion to his left lower leg

## 2023-05-10 NOTE — ED PROVIDER NOTES
Encounter Date: 5/10/2023       History     Chief Complaint   Patient presents with    Abrasion     Left leg     Presented with c/o abrasion and swelling to left lower leg/calf area that occurred after falling through his bathroom floor that is under construction. Denies pain with amb or wt bearing to extremity. Is concerned that he needs tetanus, is on blood thinner and is a diabetic.     Review of patient's allergies indicates:   Allergen Reactions    Brilinta [ticagrelor] Nausea And Vomiting    Demerol [meperidine] Nausea And Vomiting    Iodinated contrast media Rash     Other reaction(s): Unknown    Sulfa (sulfonamide antibiotics) Rash     Cold sweats    Sulfamethoxazole-trimethoprim Rash     Past Medical History:   Diagnosis Date    Diabetes mellitus     Heart attack     Hypertension     Stroke      Past Surgical History:   Procedure Laterality Date    knee scope Bilateral     MYRINGOTOMY WITH INSERTION OF VENTILATION TUBE Right 6/17/2022    Procedure: RIGHT MYRINGOTOMY, WITH TYMPANOSTOMY TUBE INSERTION;  Surgeon: Rufus Lynn MD;  Location: [facility];  Service: ENT;  Laterality: Right;    NECK SURGERY       History reviewed. No pertinent family history.  Social History     Tobacco Use    Smoking status: Never    Smokeless tobacco: Never   Substance Use Topics    Alcohol use: Never    Drug use: Never     Review of Systems   Constitutional:  Negative for activity change and fever.   HENT: Negative.     Respiratory:  Negative for shortness of breath.    Cardiovascular:  Negative for chest pain.   Gastrointestinal:  Negative for abdominal pain.   Genitourinary: Negative.    Musculoskeletal:  Positive for myalgias (left calf). Negative for gait problem.   Skin:  Positive for wound (abrasion to left calf).   Neurological:  Negative for dizziness, weakness and light-headedness.   Psychiatric/Behavioral: Negative.       Physical Exam     Initial Vitals [05/10/23 1739]   BP Pulse Resp Temp SpO2   (!) 194/73 66  18 97.5 °F (36.4 °C) 98 %      MAP       --         Physical Exam    Nursing note and vitals reviewed.  Constitutional: He appears well-developed and well-nourished. No distress.   HENT:   Head: Normocephalic.   Right Ear: External ear normal.   Left Ear: External ear normal.   Nose: Nose normal.   Mouth/Throat: Oropharynx is clear and moist.   Eyes: Conjunctivae and EOM are normal.   Neck: Neck supple.   Normal range of motion.  Cardiovascular:  Normal rate, regular rhythm, normal heart sounds and intact distal pulses.           No murmur heard.  Pulmonary/Chest: Breath sounds normal. He has no wheezes. He has no rhonchi. He has no rales.   Abdominal: Abdomen is soft. Bowel sounds are normal. There is no abdominal tenderness.   Musculoskeletal:         General: Tenderness present. No edema. Normal range of motion.      Cervical back: Normal range of motion and neck supple.        Legs:         Feet:      Neurological: He is alert and oriented to person, place, and time. He has normal strength. GCS score is 15. GCS eye subscore is 4. GCS verbal subscore is 5. GCS motor subscore is 6.   Skin: Skin is warm and dry. Capillary refill takes less than 2 seconds.   Abrasion to left calf with hematoma as noted   Psychiatric: He has a normal mood and affect.                 Medical Screening Exam   See Full Note    ED Course   Procedures  Labs Reviewed - No data to display       Imaging Results              X-Ray Toe 2 or More Views Right (In process)  Result time 05/10/23 18:36:48   Procedure changed from X-Ray Toe 2 or More Views Left                 X-Rays:   Independently Interpreted Readings:   Other Readings:  Left great toe: No convincing acute fracture or dislocation demonstrated. No concerning radiopaque foreign body visualized.  Scattered degenerative change.  No convincing osseous erosion to suggest acute osteomyelitis.  Right great toe: No convincing acute fracture or dislocation demonstrated. No concerning  radiopaque foreign body visualized.  Scattered degenerative change.  No convincing osseous erosion to suggest acute osteomyelitis.  Medications   Tdap (BOOSTRIX) vaccine injection 0.5 mL (0.5 mLs Intramuscular Given 5/10/23 1801)     Medical Decision Making:   ED Management:  Presented with c/o abrasion and swelling to left lower leg/calf area that occurred after falling through his bathroom floor that is under construction. Denies pain with amb or wt bearing to extremity. Is concerned that he needs tetanus, is on blood thinner and is a diabetic.    Wound care per ED staff. Tetanus tox given. Prior to discharge pt's wife c/o diabetic ulcers of both great toes that are chronic. Report that hs PCP looked at them a few months ago but did not recommend further treatment. Denies fever or chills or n/v. States has been diagnosed with PVD with decrease circulation of lower ext. Stent was recommended by Dr. Washington in the past but was unable to do it. Has not discussed with Dr. Washington further in the last 2 years. Xrays of both great toes ordered and reviewed. They show no evidence of osteomyelitis.    Discussed findings with pt and wife. Instructed on need for follow-up with vascular u/s of lower ext bilat and evaluation of ulcers by surgeon. Sees Dr. Washington, Cardiology at [facility] and has u/s last about 2 years ago. Instructed that ED nurse will make appt tomorrow for follow-up. Rx for keflex for wounds sustained today. Detailed home care and follow-up instructions provided.                       Clinical Impression:   Final diagnoses:  [W13.3XXA] Fall through floor, initial encounter (Primary)  [S80.812A] Abrasion of left calf, initial encounter  [S80.12XA] Contusion of left calf, initial encounter        ED Disposition Condition    Discharge Stable          ED Prescriptions       Medication Sig Dispense Start Date End Date Auth. Provider    cephALEXin (KEFLEX) 500 MG capsule Take 1 capsule (500 mg total) by mouth every 8 (eight)  hours. for 10 days 30 capsule 5/10/2023 5/20/2023 Jasmyn Calixto NP          Follow-up Information       Follow up With Specialties Details Why Contact Info    Erin Nobles MD Endocrinology, Diabetes In 2 days for recheck [address] [place] [address]  [phone]               Jasmyn Calixto NP  05/10/23 1916

## 2023-05-10 NOTE — DISCHARGE INSTRUCTIONS
Cleanse wound with antibacterial soap and water at least daily until healed. Apply ice pack to site of hematoma for 20 minutes 4-6 times per day for 48 hours then may apply warm compresses to help reduce bruising. Take Cephalexin as prescribed for 10 days. Watch for signs of infection and return to the ED if noted. Follow-up with your PCP in 2 days for recheck. May return to the ED for recheck if unable to see your PCP.

## 2023-07-14 DIAGNOSIS — E11.9 DIABETES MELLITUS: Primary | ICD-10-CM

## 2023-08-11 DIAGNOSIS — E11.9 DIABETES: Primary | ICD-10-CM

## 2023-09-26 ENCOUNTER — HOSPITAL ENCOUNTER (EMERGENCY)
Facility: HOSPITAL | Age: 68
Discharge: HOME | End: 2023-09-27
Payer: MEDICARE

## 2023-09-26 DIAGNOSIS — T38.3X1A ACCIDENTAL OVERDOSE OF INSULIN: Primary | ICD-10-CM

## 2023-09-26 DIAGNOSIS — N18.4 STAGE 4 CHRONIC KIDNEY DISEASE: ICD-10-CM

## 2023-09-26 LAB
ALBUMIN SERPL BCP-MCNC: 3.4 G/DL (ref 3.5–5)
ALBUMIN/GLOB SERPL: 1.1 {RATIO}
ALP SERPL-CCNC: 93 U/L (ref 45–115)
ALT SERPL W P-5'-P-CCNC: 17 U/L (ref 16–61)
ANION GAP SERPL CALCULATED.3IONS-SCNC: 14 MMOL/L (ref 7–16)
AST SERPL W P-5'-P-CCNC: 19 U/L (ref 15–37)
BASOPHILS # BLD AUTO: 0.07 K/UL (ref 0–0.2)
BASOPHILS NFR BLD AUTO: 1.2 % (ref 0–1)
BILIRUB SERPL-MCNC: 0.4 MG/DL (ref ?–1.2)
BUN SERPL-MCNC: 65 MG/DL (ref 7–18)
BUN/CREAT SERPL: 14 (ref 6–20)
CALCIUM SERPL-MCNC: 8.5 MG/DL (ref 8.5–10.1)
CHLORIDE SERPL-SCNC: 111 MMOL/L (ref 98–107)
CO2 SERPL-SCNC: 23 MMOL/L (ref 21–32)
CREAT SERPL-MCNC: 4.5 MG/DL (ref 0.7–1.3)
DIFFERENTIAL METHOD BLD: ABNORMAL
EGFR (NO RACE VARIABLE) (RUSH/TITUS): 13 ML/MIN/1.73M2
EOSINOPHIL # BLD AUTO: 0.78 K/UL (ref 0–0.5)
EOSINOPHIL NFR BLD AUTO: 12.8 % (ref 1–4)
ERYTHROCYTE [DISTWIDTH] IN BLOOD BY AUTOMATED COUNT: 13.8 % (ref 11.5–14.5)
GLOBULIN SER-MCNC: 3.1 G/DL (ref 2–4)
GLUCOSE SERPL-MCNC: 94 MG/DL (ref 74–106)
HCT VFR BLD AUTO: 30.8 % (ref 40–54)
HGB BLD-MCNC: 9.7 G/DL (ref 13.5–18)
LYMPHOCYTES # BLD AUTO: 1.36 K/UL (ref 1–4.8)
LYMPHOCYTES NFR BLD AUTO: 22.4 % (ref 27–41)
MCH RBC QN AUTO: 28.8 PG (ref 27–31)
MCHC RBC AUTO-ENTMCNC: 31.5 G/DL (ref 32–36)
MCV RBC AUTO: 91.4 FL (ref 80–96)
MONOCYTES # BLD AUTO: 0.29 K/UL (ref 0–0.8)
MONOCYTES NFR BLD AUTO: 4.8 % (ref 2–6)
MPC BLD CALC-MCNC: 9.3 FL (ref 9.4–12.4)
NEUTROPHILS # BLD AUTO: 3.58 K/UL (ref 1.8–7.7)
NEUTROPHILS NFR BLD AUTO: 58.8 % (ref 53–65)
PLATELET # BLD AUTO: 174 K/UL (ref 150–400)
POTASSIUM SERPL-SCNC: 4.2 MMOL/L (ref 3.5–5.1)
PROT SERPL-MCNC: 6.5 G/DL (ref 6.4–8.2)
RBC # BLD AUTO: 3.37 M/UL (ref 4.6–6.2)
SODIUM SERPL-SCNC: 144 MMOL/L (ref 136–145)
WBC # BLD AUTO: 6.08 K/UL (ref 4.5–11)

## 2023-09-26 PROCEDURE — 96366 THER/PROPH/DIAG IV INF ADDON: CPT

## 2023-09-26 PROCEDURE — 85025 COMPLETE CBC W/AUTO DIFF WBC: CPT | Performed by: NURSE PRACTITIONER

## 2023-09-26 PROCEDURE — 80053 COMPREHEN METABOLIC PANEL: CPT | Performed by: NURSE PRACTITIONER

## 2023-09-26 PROCEDURE — 99284 EMERGENCY DEPT VISIT MOD MDM: CPT | Mod: GF

## 2023-09-26 PROCEDURE — 99284 EMERGENCY DEPT VISIT MOD MDM: CPT

## 2023-09-26 PROCEDURE — 63600175 PHARM REV CODE 636 W HCPCS: Performed by: NURSE PRACTITIONER

## 2023-09-26 PROCEDURE — 96365 THER/PROPH/DIAG IV INF INIT: CPT

## 2023-09-26 RX ORDER — DEXTROSE MONOHYDRATE AND SODIUM CHLORIDE 5; .9 G/100ML; G/100ML
1000 INJECTION, SOLUTION INTRAVENOUS
Status: COMPLETED | OUTPATIENT
Start: 2023-09-26 | End: 2023-09-27

## 2023-09-26 RX ORDER — DEXTROSE MONOHYDRATE 100 MG/ML
INJECTION, SOLUTION INTRAVENOUS
Status: DISCONTINUED | OUTPATIENT
Start: 2023-09-26 | End: 2023-09-26

## 2023-09-26 RX ADMIN — DEXTROSE AND SODIUM CHLORIDE 1000 ML: 5; 900 INJECTION, SOLUTION INTRAVENOUS at 10:09

## 2023-09-27 VITALS
DIASTOLIC BLOOD PRESSURE: 75 MMHG | HEIGHT: 69 IN | BODY MASS INDEX: 33.47 KG/M2 | OXYGEN SATURATION: 98 % | HEART RATE: 81 BPM | RESPIRATION RATE: 18 BRPM | WEIGHT: 226 LBS | SYSTOLIC BLOOD PRESSURE: 181 MMHG | TEMPERATURE: 98 F

## 2023-09-27 LAB
ANION GAP SERPL CALCULATED.3IONS-SCNC: 11 MMOL/L (ref 7–16)
BACTERIA #/AREA URNS HPF: NORMAL /HPF
BILIRUB UR QL STRIP: NEGATIVE
BUN SERPL-MCNC: 62 MG/DL (ref 7–18)
BUN/CREAT SERPL: 15 (ref 6–20)
CALCIUM SERPL-MCNC: 8.1 MG/DL (ref 8.5–10.1)
CHLORIDE SERPL-SCNC: 112 MMOL/L (ref 98–107)
CLARITY UR: CLEAR
CO2 SERPL-SCNC: 23 MMOL/L (ref 21–32)
COLOR UR: ABNORMAL
CREAT SERPL-MCNC: 4.24 MG/DL (ref 0.7–1.3)
EGFR (NO RACE VARIABLE) (RUSH/TITUS): 14 ML/MIN/1.73M2
GLUCOSE SERPL-MCNC: 145 MG/DL (ref 70–105)
GLUCOSE SERPL-MCNC: 180 MG/DL (ref 74–106)
GLUCOSE UR STRIP-MCNC: NEGATIVE MG/DL
KETONES UR STRIP-SCNC: NEGATIVE MG/DL
LEUKOCYTE ESTERASE UR QL STRIP: NEGATIVE
NITRITE UR QL STRIP: NEGATIVE
PH UR STRIP: 5.5 PH UNITS
POTASSIUM SERPL-SCNC: 4.4 MMOL/L (ref 3.5–5.1)
PROT UR QL STRIP: 100
RBC # UR STRIP: NEGATIVE /UL
RBC #/AREA URNS HPF: NORMAL /HPF
SODIUM SERPL-SCNC: 142 MMOL/L (ref 136–145)
SP GR UR STRIP: 1.01
SQUAMOUS #/AREA URNS LPF: NORMAL /LPF
UROBILINOGEN UR STRIP-ACNC: 0.2 MG/DL
WBC #/AREA URNS HPF: NORMAL /HPF

## 2023-09-27 PROCEDURE — 82962 GLUCOSE BLOOD TEST: CPT

## 2023-09-27 PROCEDURE — 80048 BASIC METABOLIC PNL TOTAL CA: CPT | Performed by: NURSE PRACTITIONER

## 2023-09-27 PROCEDURE — 81001 URINALYSIS AUTO W/SCOPE: CPT | Performed by: NURSE PRACTITIONER

## 2023-09-27 NOTE — ED TRIAGE NOTES
Presents to ER with c/o accidentally taking the wrong insulin tonight. Was supposed to be taking tresepa but instead got the novulog insulin and took 32 units. This occurred about 30 minutes ago. Called poison control and they advised to come to the ER. Sugar prior to taking insulin was 114 and it dropped down to 88. Alert and oriented. Ambulatory into ER per self. Did eat some candy prior to coming to ER.

## 2023-09-27 NOTE — ED PROVIDER NOTES
Encounter Date: 9/26/2023       History     Chief Complaint   Patient presents with    Hypoglycemia     Presented accompanied by wife with c/o concern for hypoglycemia after taking Novolog 32 units SC at home as opposed to Tresiba 32 units SC by mistake approx 30 min PTA. Contacted [employer] control and was told to come to the ED.  glucose was 114 prior to taking insulin, dropped down to 84 and is now 91 after eating some sour air heads. Denies any weakness, diaphoresis, confusion, n/v, chest pain or dyspnea.  usually does no become symptomatic with hypoglycemia until sugar is below 50. Pt is monitoring his glucose continuously using a system connected to his phone.  has been at the hospital with his wife having a scope most of the day today. Reports PMH CAD, HTN, CKD.       Review of patient's allergies indicates:   Allergen Reactions    Brilinta [ticagrelor] Nausea And Vomiting    Demerol [meperidine] Nausea And Vomiting    Iodinated contrast media Rash     Other reaction(s): Unknown    Sulfa (sulfonamide antibiotics) Rash     Cold sweats    Sulfamethoxazole-trimethoprim Rash     Past Medical History:   Diagnosis Date    Diabetes mellitus     Heart attack     Hypertension     Stroke      Past Surgical History:   Procedure Laterality Date    knee scope Bilateral     MYRINGOTOMY WITH INSERTION OF VENTILATION TUBE Right 6/17/2022    Procedure: RIGHT MYRINGOTOMY, WITH TYMPANOSTOMY TUBE INSERTION;  Surgeon: Rufus Lynn MD;  Location: [facility];  Service: ENT;  Laterality: Right;    NECK SURGERY       History reviewed. No pertinent family history.  Social History     Tobacco Use    Smoking status: Never    Smokeless tobacco: Never   Substance Use Topics    Alcohol use: Never    Drug use: Never     Review of Systems   Constitutional:  Negative for activity change, appetite change and fever.   HENT:  Negative for congestion.    Respiratory:  Negative for cough, chest tightness and shortness of  breath.    Cardiovascular:  Negative for chest pain and palpitations.   Gastrointestinal:  Negative for abdominal pain, diarrhea, nausea and vomiting.   Genitourinary:  Negative for difficulty urinating and dysuria.   Musculoskeletal: Negative.    Skin: Negative.    Neurological:  Negative for dizziness, speech difficulty, weakness, light-headedness and headaches.   Psychiatric/Behavioral: Negative.         Physical Exam     Initial Vitals [09/26/23 2152]   BP Pulse Resp Temp SpO2   (!) 184/73 78 18 97.8 °F (36.6 °C) 99 %      MAP       --         Physical Exam    Nursing note and vitals reviewed.  Constitutional: He appears well-developed and well-nourished. No distress.   HENT:   Head: Normocephalic.   Right Ear: External ear normal.   Left Ear: External ear normal.   Nose: Nose normal.   Mouth/Throat: Mucous membranes are dry.   Eyes: Conjunctivae and EOM are normal. Pupils are equal, round, and reactive to light.   Neck: Neck supple. No JVD present.   Normal range of motion.  Cardiovascular:  Normal rate, regular rhythm, normal heart sounds and intact distal pulses.           No murmur heard.  Pulmonary/Chest: Breath sounds normal. He has no wheezes. He has no rhonchi. He has no rales.   Abdominal: Abdomen is soft. Bowel sounds are normal. There is no abdominal tenderness.   Musculoskeletal:         General: Normal range of motion.      Cervical back: Normal range of motion and neck supple.     Neurological: He is alert and oriented to person, place, and time. He has normal strength. GCS score is 15. GCS eye subscore is 4. GCS verbal subscore is 5. GCS motor subscore is 6.   Skin: Skin is warm and dry. Capillary refill takes less than 2 seconds.   Psychiatric: He has a normal mood and affect.         Medical Screening Exam   See Full Note    ED Course   Procedures  Labs Reviewed   COMPREHENSIVE METABOLIC PANEL - Abnormal; Notable for the following components:       Result Value    Chloride 111 (*)     BUN 65 (*)      Creatinine 4.50 (*)     Albumin 3.4 (*)     eGFR 13 (*)     All other components within normal limits   CBC WITH DIFFERENTIAL - Abnormal; Notable for the following components:    RBC 3.37 (*)     Hemoglobin 9.7 (*)     Hematocrit 30.8 (*)     MCHC 31.5 (*)     MPV 9.3 (*)     Lymphocytes % 22.4 (*)     Eosinophils % 12.8 (*)     Basophils % 1.2 (*)     Eosinophils, Absolute 0.78 (*)     All other components within normal limits   URINALYSIS, REFLEX TO URINE CULTURE - Abnormal; Notable for the following components:    Protein,  (*)     All other components within normal limits   BASIC METABOLIC PANEL - Abnormal; Notable for the following components:    Chloride 112 (*)     Glucose 180 (*)     BUN 62 (*)     Creatinine 4.24 (*)     Calcium 8.1 (*)     eGFR 14 (*)     All other components within normal limits   POCT GLUCOSE MONITORING CONTINUOUS - Abnormal; Notable for the following components:    POC Glucose 145 (*)     All other components within normal limits   URINALYSIS, MICROSCOPIC - Normal   CBC W/ AUTO DIFFERENTIAL    Narrative:     The following orders were created for panel order CBC auto differential.  Procedure                               Abnormality         Status                     ---------                               -----------         ------                     CBC with Differential[[id number]]        Abnormal            Final result                 Please view results for these tests on the individual orders.          Imaging Results    None          Medications   dextrose 5 % and 0.9 % NaCl infusion (0 mLs Intravenous Stopped 9/27/23 0050)     Medical Decision Making  Presented accompanied by wife with c/o concern for hypoglycemia after taking Novolog 32 units SC at home as opposed to Tresiba 32 units SC by mistake approx 30 min PTA. Contacted [employer] control and was told to come to the ED. States glucose was 114 prior to taking insulin, dropped down to 84 and is now 91 after eating  some sour air heads. Denies any weakness, diaphoresis, confusion, n/v, chest pain or dyspnea.  has been at the hospital with his wife having a scope most of the day today. Reports PMH CAD, HTN, CKD.   Pt states that he has never taken the wrong insulin until today.    Amount and/or Complexity of Data Reviewed  Labs: ordered. Decision-making details documented in ED Course.    Risk  Prescription drug management.  Risk Details: D5NS x 1 liter infused while in ED. Pt stable. Discharged home with detailed home care, follow-up and return precautions provided.               ED Course as of 09/27/23 0204   Tue Sep 26, 2023   2243 Sodium: 144 [DP]   2243 Potassium: 4.2 [DP]   2243 BUN(!): 65 [DP]   2243 Creatinine(!): 4.50 [DP]   2243 Discussed BUN and creatinine with pt. He reports that he is under the care of Dr. Yifan Kuhn, Nephrologist. He notes that he last saw him 1 mo ago. He says he thinks his baseline creatinine is > 3 but unsure of exact number. Reports that he has not been drinking much today since being at the hospital with wife.  has otherwise felt fine. Denies use of diuretic or recent n/v/d.  D5NS increased to 250ml/h.  Discussed possible admission for IV due to possible [place] on CKD. Pt refuses stating that he will follow-up with Dr. Kuhn. [DP]   2257 Pt reports glucose is 78 per his monitor. Asymptomatic. D5NS increased to 500 ml/h.   [DP]   2258 WBC: 6.08 [DP]   2258 Hemoglobin(!): 9.7 [DP]   2258 Hematocrit(!): 30.8 [DP]   2258 Platelets: 174 [DP]   2330 Pt remains alert. States glucose is 98 now. [DP]   Wed Sep 27, 2023   0020 Nurse reports accucheck 145. [DP]   0053 Specific Gravity, UA: 1.010 [DP]   0053 Protein, UA(!): 100 [DP]   0053 Leukocytes, UA: Negative [DP]   0053 NITRITE UA: Negative [DP]   0053 Ketones, UA: Negative [DP]   0053 Poison control contacted ED. Recommended monitoring pt 1-2 hours after infusion. Pt reports that his glucose per his monitor is 178 now. Infusion stopped.  Pt has had 1 liter in now. Will monitor and recheck BMP at 0130. [DP]   0149 Sodium: 142 [DP]   0149 BUN(!): 62 [DP]   0149 Creatinine(!): 4.24 [DP]   0149 Potassium: 4.4 [DP]   0149 Estimated creatinine clearance 24. [DP]   0150 Glucose(!): 180 [DP]   0155 PT reports his glucose on his monitor is 158 now. Discussed labs with pt. Bun 62, creatinine 4.24. Unchanged since IVF infusion. Instructed on need to follow-up with Dr. Kuhn. Verbalized understanding.  [DP]      ED Course User Index  [DP] Jasmyn Calixto NP                    Clinical Impression:   Final diagnoses:  [T38.3X1A] Accidental overdose of insulin (Primary)  [N18.4] Stage 4 chronic kidney disease        ED Disposition Condition    Discharge Stable          ED Prescriptions    None       Follow-up Information       Follow up With Specialties Details Why Contact Info    Yifan Kuhn Jr., MD Internal Medicine Schedule an appointment as soon as possible for a visit   [address] [place] [address]  [facility] [address]  [phone]      [facility] - Emergency Department Emergency Medicine  For Hypoglycemia [address] [place] [id number] [phone]             Jasmyn Calixto NP  09/27/23 0929

## 2023-09-27 NOTE — DISCHARGE INSTRUCTIONS
Continue your medications as prescribed. Eat a snack when you get home this morning. Continue to monitor your sugars.   Follow-up with Dr. Yifan Kuhn for recheck. Your BUN is 62 and creatinine is 4.24. Return to the ED for any signs of hypoglycemia or other concerning symptoms.

## 2023-10-03 NOTE — ADDENDUM NOTE
Encounter addended by: Gavi Brown on: 10/3/2023 10:23 AM   Actions taken: SmartForm saved, Flowsheet accepted

## 2023-12-20 ENCOUNTER — HOSPITAL ENCOUNTER (EMERGENCY)
Facility: HOSPITAL | Age: 68
Discharge: ANOTHER HOSPITAL | End: 2023-12-21
Payer: MEDICARE

## 2023-12-20 DIAGNOSIS — E87.5 HYPERKALEMIA: ICD-10-CM

## 2023-12-20 DIAGNOSIS — N17.9 ACUTE RENAL FAILURE SUPERIMPOSED ON STAGE 4 CHRONIC KIDNEY DISEASE, UNSPECIFIED ACUTE RENAL FAILURE TYPE: ICD-10-CM

## 2023-12-20 DIAGNOSIS — N18.4 ACUTE RENAL FAILURE SUPERIMPOSED ON STAGE 4 CHRONIC KIDNEY DISEASE, UNSPECIFIED ACUTE RENAL FAILURE TYPE: ICD-10-CM

## 2023-12-20 DIAGNOSIS — R06.02 SHORTNESS OF BREATH: ICD-10-CM

## 2023-12-20 DIAGNOSIS — J18.9 PNEUMONIA OF LEFT LOWER LOBE DUE TO INFECTIOUS ORGANISM: ICD-10-CM

## 2023-12-20 DIAGNOSIS — R10.9 RIGHT FLANK PAIN: ICD-10-CM

## 2023-12-20 DIAGNOSIS — R09.02 HYPOXIA: ICD-10-CM

## 2023-12-20 DIAGNOSIS — I21.4 NSTEMI (NON-ST ELEVATED MYOCARDIAL INFARCTION): Primary | ICD-10-CM

## 2023-12-20 LAB
ALBUMIN SERPL BCP-MCNC: 3.9 G/DL (ref 3.5–5)
ALBUMIN/GLOB SERPL: 1 {RATIO}
ALP SERPL-CCNC: 96 U/L (ref 45–115)
ALT SERPL W P-5'-P-CCNC: 28 U/L (ref 16–61)
ANION GAP SERPL CALCULATED.3IONS-SCNC: 18 MMOL/L (ref 7–16)
AST SERPL W P-5'-P-CCNC: 21 U/L (ref 15–37)
BASOPHILS # BLD AUTO: 0.02 K/UL (ref 0–0.2)
BASOPHILS NFR BLD AUTO: 0.2 % (ref 0–1)
BILIRUB SERPL-MCNC: 0.7 MG/DL (ref ?–1.2)
BILIRUB UR QL STRIP: NEGATIVE
BUN SERPL-MCNC: 65 MG/DL (ref 7–18)
BUN/CREAT SERPL: 13 (ref 6–20)
CALCIUM SERPL-MCNC: 9 MG/DL (ref 8.5–10.1)
CHLORIDE SERPL-SCNC: 110 MMOL/L (ref 98–107)
CLARITY UR: CLEAR
CO2 SERPL-SCNC: 21 MMOL/L (ref 21–32)
COLOR UR: YELLOW
CREAT SERPL-MCNC: 4.95 MG/DL (ref 0.7–1.3)
DIFFERENTIAL METHOD BLD: ABNORMAL
EGFR (NO RACE VARIABLE) (RUSH/TITUS): 12 ML/MIN/1.73M2
EOSINOPHIL # BLD AUTO: 0.91 K/UL (ref 0–0.5)
EOSINOPHIL NFR BLD AUTO: 11.3 % (ref 1–4)
ERYTHROCYTE [DISTWIDTH] IN BLOOD BY AUTOMATED COUNT: 13.8 % (ref 11.5–14.5)
FLUAV AG UPPER RESP QL IA.RAPID: NEGATIVE
FLUBV AG UPPER RESP QL IA.RAPID: NEGATIVE
GLOBULIN SER-MCNC: 4 G/DL (ref 2–4)
GLUCOSE SERPL-MCNC: 185 MG/DL (ref 74–106)
GLUCOSE UR STRIP-MCNC: 500 MG/DL
HCT VFR BLD AUTO: 31.7 % (ref 40–54)
HGB BLD-MCNC: 9.5 G/DL (ref 13.5–18)
KETONES UR STRIP-SCNC: NEGATIVE MG/DL
LACTATE SERPL-SCNC: 0.8 MMOL/L (ref 0.4–2)
LEUKOCYTE ESTERASE UR QL STRIP: NEGATIVE
LIPASE SERPL-CCNC: 28 U/L (ref 16–77)
LYMPHOCYTES # BLD AUTO: 0.96 K/UL (ref 1–4.8)
LYMPHOCYTES NFR BLD AUTO: 11.9 % (ref 27–41)
MAGNESIUM SERPL-MCNC: 1.7 MG/DL (ref 1.7–2.3)
MCH RBC QN AUTO: 27.4 PG (ref 27–31)
MCHC RBC AUTO-ENTMCNC: 30 G/DL (ref 32–36)
MCV RBC AUTO: 91.4 FL (ref 80–96)
MONOCYTES # BLD AUTO: 0.63 K/UL (ref 0–0.8)
MONOCYTES NFR BLD AUTO: 7.8 % (ref 2–6)
MPC BLD CALC-MCNC: 9.6 FL (ref 9.4–12.4)
NEUTROPHILS # BLD AUTO: 5.56 K/UL (ref 1.8–7.7)
NEUTROPHILS NFR BLD AUTO: 68.8 % (ref 53–65)
NITRITE UR QL STRIP: NEGATIVE
NT-PROBNP SERPL-MCNC: ABNORMAL PG/ML (ref 1–125)
PH UR STRIP: 5 PH UNITS
PLATELET # BLD AUTO: 163 K/UL (ref 150–400)
POTASSIUM SERPL-SCNC: 5.5 MMOL/L (ref 3.5–5.1)
PROT SERPL-MCNC: 7.9 G/DL (ref 6.4–8.2)
PROT UR QL STRIP: 100
RBC # BLD AUTO: 3.47 M/UL (ref 4.6–6.2)
RBC # UR STRIP: NEGATIVE /UL
SARS-COV-2 RDRP RESP QL NAA+PROBE: NEGATIVE
SODIUM SERPL-SCNC: 143 MMOL/L (ref 136–145)
SP GR UR STRIP: 1.01
SQUAMOUS #/AREA URNS LPF: ABNORMAL /LPF
TROPONIN I SERPL DL<=0.01 NG/ML-MCNC: 652.7 PG/ML
TROPONIN I SERPL DL<=0.01 NG/ML-MCNC: 723.7 PG/ML
UROBILINOGEN UR STRIP-ACNC: 0.2 MG/DL
WBC # BLD AUTO: 8.08 K/UL (ref 4.5–11)
WBC #/AREA URNS HPF: ABNORMAL /HPF
YEAST #/AREA URNS HPF: ABNORMAL /HPF

## 2023-12-20 PROCEDURE — 94640 AIRWAY INHALATION TREATMENT: CPT | Mod: XB

## 2023-12-20 PROCEDURE — 93010 ELECTROCARDIOGRAM REPORT: CPT | Performed by: HOSPITALIST

## 2023-12-20 PROCEDURE — 96365 THER/PROPH/DIAG IV INF INIT: CPT

## 2023-12-20 PROCEDURE — 25000242 PHARM REV CODE 250 ALT 637 W/ HCPCS: Performed by: NURSE PRACTITIONER

## 2023-12-20 PROCEDURE — 93005 ELECTROCARDIOGRAM TRACING: CPT

## 2023-12-20 PROCEDURE — 27000221 HC OXYGEN, UP TO 24 HOURS

## 2023-12-20 PROCEDURE — 87635 SARS-COV-2 COVID-19 AMP PRB: CPT | Performed by: NURSE PRACTITIONER

## 2023-12-20 PROCEDURE — 87804 INFLUENZA ASSAY W/OPTIC: CPT | Performed by: NURSE PRACTITIONER

## 2023-12-20 PROCEDURE — 81001 URINALYSIS AUTO W/SCOPE: CPT | Performed by: NURSE PRACTITIONER

## 2023-12-20 PROCEDURE — 99285 EMERGENCY DEPT VISIT HI MDM: CPT | Mod: GF,25

## 2023-12-20 PROCEDURE — 96372 THER/PROPH/DIAG INJ SC/IM: CPT | Mod: 59 | Performed by: NURSE PRACTITIONER

## 2023-12-20 PROCEDURE — 25000003 PHARM REV CODE 250: Performed by: NURSE PRACTITIONER

## 2023-12-20 PROCEDURE — 83690 ASSAY OF LIPASE: CPT | Performed by: NURSE PRACTITIONER

## 2023-12-20 PROCEDURE — 63600175 PHARM REV CODE 636 W HCPCS: Performed by: NURSE PRACTITIONER

## 2023-12-20 PROCEDURE — 96375 TX/PRO/DX INJ NEW DRUG ADDON: CPT

## 2023-12-20 PROCEDURE — 99285 EMERGENCY DEPT VISIT HI MDM: CPT | Mod: 25

## 2023-12-20 PROCEDURE — 83880 ASSAY OF NATRIURETIC PEPTIDE: CPT | Performed by: NURSE PRACTITIONER

## 2023-12-20 PROCEDURE — 84484 ASSAY OF TROPONIN QUANT: CPT | Performed by: NURSE PRACTITIONER

## 2023-12-20 PROCEDURE — 80053 COMPREHEN METABOLIC PANEL: CPT | Performed by: NURSE PRACTITIONER

## 2023-12-20 PROCEDURE — 87040 BLOOD CULTURE FOR BACTERIA: CPT | Performed by: NURSE PRACTITIONER

## 2023-12-20 PROCEDURE — 85025 COMPLETE CBC W/AUTO DIFF WBC: CPT | Performed by: NURSE PRACTITIONER

## 2023-12-20 PROCEDURE — 83605 ASSAY OF LACTIC ACID: CPT | Performed by: NURSE PRACTITIONER

## 2023-12-20 PROCEDURE — 96376 TX/PRO/DX INJ SAME DRUG ADON: CPT

## 2023-12-20 PROCEDURE — 94761 N-INVAS EAR/PLS OXIMETRY MLT: CPT

## 2023-12-20 PROCEDURE — 83735 ASSAY OF MAGNESIUM: CPT | Performed by: NURSE PRACTITIONER

## 2023-12-20 RX ORDER — FUROSEMIDE 10 MG/ML
40 INJECTION INTRAMUSCULAR; INTRAVENOUS
Status: COMPLETED | OUTPATIENT
Start: 2023-12-20 | End: 2023-12-20

## 2023-12-20 RX ORDER — ENOXAPARIN SODIUM 100 MG/ML
30 INJECTION SUBCUTANEOUS
Status: COMPLETED | OUTPATIENT
Start: 2023-12-20 | End: 2023-12-20

## 2023-12-20 RX ORDER — IPRATROPIUM BROMIDE AND ALBUTEROL SULFATE 2.5; .5 MG/3ML; MG/3ML
3 SOLUTION RESPIRATORY (INHALATION)
Status: COMPLETED | OUTPATIENT
Start: 2023-12-20 | End: 2023-12-20

## 2023-12-20 RX ORDER — MORPHINE SULFATE 4 MG/ML
2 INJECTION, SOLUTION INTRAMUSCULAR; INTRAVENOUS
Status: COMPLETED | OUTPATIENT
Start: 2023-12-20 | End: 2023-12-20

## 2023-12-20 RX ORDER — IPRATROPIUM BROMIDE AND ALBUTEROL SULFATE 2.5; .5 MG/3ML; MG/3ML
3 SOLUTION RESPIRATORY (INHALATION) EVERY 4 HOURS
Status: DISCONTINUED | OUTPATIENT
Start: 2023-12-21 | End: 2023-12-21 | Stop reason: HOSPADM

## 2023-12-20 RX ORDER — METHYLPREDNISOLONE SOD SUCC 125 MG
125 VIAL (EA) INJECTION
Status: COMPLETED | OUTPATIENT
Start: 2023-12-20 | End: 2023-12-20

## 2023-12-20 RX ORDER — ALBUTEROL SULFATE 0.83 MG/ML
2.5 SOLUTION RESPIRATORY (INHALATION)
Status: COMPLETED | OUTPATIENT
Start: 2023-12-20 | End: 2023-12-20

## 2023-12-20 RX ORDER — MORPHINE SULFATE 4 MG/ML
4 INJECTION, SOLUTION INTRAMUSCULAR; INTRAVENOUS
Status: COMPLETED | OUTPATIENT
Start: 2023-12-20 | End: 2023-12-20

## 2023-12-20 RX ORDER — ONDANSETRON 2 MG/ML
4 INJECTION INTRAMUSCULAR; INTRAVENOUS
Status: COMPLETED | OUTPATIENT
Start: 2023-12-20 | End: 2023-12-20

## 2023-12-20 RX ORDER — NAPROXEN SODIUM 220 MG/1
324 TABLET, FILM COATED ORAL
Status: COMPLETED | OUTPATIENT
Start: 2023-12-20 | End: 2023-12-20

## 2023-12-20 RX ADMIN — METHYLPREDNISOLONE SODIUM SUCCINATE 125 MG: 125 INJECTION, POWDER, FOR SOLUTION INTRAMUSCULAR; INTRAVENOUS at 09:12

## 2023-12-20 RX ADMIN — ONDANSETRON 4 MG: 2 INJECTION INTRAMUSCULAR; INTRAVENOUS at 08:12

## 2023-12-20 RX ADMIN — NITROGLYCERIN 1 INCH: 20 OINTMENT TOPICAL at 09:12

## 2023-12-20 RX ADMIN — MORPHINE SULFATE 2 MG: 4 INJECTION, SOLUTION INTRAMUSCULAR; INTRAVENOUS at 09:12

## 2023-12-20 RX ADMIN — IPRATROPIUM BROMIDE AND ALBUTEROL SULFATE 3 ML: .5; 3 SOLUTION RESPIRATORY (INHALATION) at 11:12

## 2023-12-20 RX ADMIN — ENOXAPARIN SODIUM 30 MG: 30 INJECTION SUBCUTANEOUS at 11:12

## 2023-12-20 RX ADMIN — ALBUTEROL SULFATE 2.5 MG: 2.5 SOLUTION RESPIRATORY (INHALATION) at 09:12

## 2023-12-20 RX ADMIN — ASPIRIN 81 MG 324 MG: 81 TABLET ORAL at 09:12

## 2023-12-20 RX ADMIN — IPRATROPIUM BROMIDE AND ALBUTEROL SULFATE 3 ML: 2.5; .5 SOLUTION RESPIRATORY (INHALATION) at 09:12

## 2023-12-20 RX ADMIN — CEFTRIAXONE SODIUM 1 G: 1 INJECTION, POWDER, FOR SOLUTION INTRAMUSCULAR; INTRAVENOUS at 09:12

## 2023-12-20 RX ADMIN — MORPHINE SULFATE 4 MG: 4 INJECTION, SOLUTION INTRAMUSCULAR; INTRAVENOUS at 08:12

## 2023-12-20 RX ADMIN — FUROSEMIDE 40 MG: 10 INJECTION, SOLUTION INTRAMUSCULAR; INTRAVENOUS at 09:12

## 2023-12-21 VITALS
SYSTOLIC BLOOD PRESSURE: 154 MMHG | HEART RATE: 91 BPM | DIASTOLIC BLOOD PRESSURE: 72 MMHG | TEMPERATURE: 97 F | WEIGHT: 220 LBS | BODY MASS INDEX: 32.58 KG/M2 | HEIGHT: 69 IN | RESPIRATION RATE: 22 BRPM | OXYGEN SATURATION: 95 %

## 2023-12-21 LAB
ALBUMIN SERPL BCP-MCNC: 3.3 G/DL (ref 3.5–5)
ALBUMIN/GLOB SERPL: 0.9 {RATIO}
ALP SERPL-CCNC: 83 U/L (ref 45–115)
ALT SERPL W P-5'-P-CCNC: 22 U/L (ref 16–61)
ANION GAP SERPL CALCULATED.3IONS-SCNC: 18 MMOL/L (ref 7–16)
AST SERPL W P-5'-P-CCNC: 21 U/L (ref 15–37)
BASOPHILS # BLD AUTO: 0.01 K/UL (ref 0–0.2)
BASOPHILS NFR BLD AUTO: 0.2 % (ref 0–1)
BILIRUB SERPL-MCNC: 0.6 MG/DL (ref ?–1.2)
BUN SERPL-MCNC: 75 MG/DL (ref 7–18)
BUN/CREAT SERPL: 14 (ref 6–20)
CALCIUM SERPL-MCNC: 8.2 MG/DL (ref 8.5–10.1)
CHLORIDE SERPL-SCNC: 108 MMOL/L (ref 98–107)
CO2 SERPL-SCNC: 20 MMOL/L (ref 21–32)
CREAT SERPL-MCNC: 5.28 MG/DL (ref 0.7–1.3)
DIFFERENTIAL METHOD BLD: ABNORMAL
EGFR (NO RACE VARIABLE) (RUSH/TITUS): 11 ML/MIN/1.73M2
EOSINOPHIL # BLD AUTO: 0.03 K/UL (ref 0–0.5)
EOSINOPHIL NFR BLD AUTO: 0.6 % (ref 1–4)
ERYTHROCYTE [DISTWIDTH] IN BLOOD BY AUTOMATED COUNT: 13.6 % (ref 11.5–14.5)
GLOBULIN SER-MCNC: 3.7 G/DL (ref 2–4)
GLUCOSE SERPL-MCNC: 321 MG/DL (ref 74–106)
GLUCOSE SERPL-MCNC: 344 MG/DL (ref 70–105)
HCO3 UR-SCNC: 19.7 MMOL/L (ref 24–28)
HCT VFR BLD AUTO: 27.4 % (ref 40–54)
HGB BLD-MCNC: 8 G/DL (ref 13.5–18)
LDH SERPL L TO P-CCNC: 0.6 MMOL/L (ref 0.3–1.2)
LYMPHOCYTES # BLD AUTO: 0.24 K/UL (ref 1–4.8)
LYMPHOCYTES NFR BLD AUTO: 4.7 % (ref 27–41)
MCH RBC QN AUTO: 27.3 PG (ref 27–31)
MCHC RBC AUTO-ENTMCNC: 29.2 G/DL (ref 32–36)
MCV RBC AUTO: 93.5 FL (ref 80–96)
MONOCYTES # BLD AUTO: 0.07 K/UL (ref 0–0.8)
MONOCYTES NFR BLD AUTO: 1.4 % (ref 2–6)
MPC BLD CALC-MCNC: 8.9 FL (ref 9.4–12.4)
NEUTROPHILS # BLD AUTO: 4.73 K/UL (ref 1.8–7.7)
NEUTROPHILS NFR BLD AUTO: 93.1 % (ref 53–65)
PCO2 BLDA: 43 MMHG (ref 41–51)
PH SMN: 7.27 [PH] (ref 7.32–7.42)
PLATELET # BLD AUTO: 138 K/UL (ref 150–400)
PO2 BLDA: 39 MMHG (ref 25–40)
POC BASE EXCESS: -6.9 MMOL/L (ref -2–3)
POC CO2: 21 MMOL/L
POC IONIZED CALCIUM: 1.12 MMOL/L (ref 1.15–1.35)
POC SATURATED O2: 65 %
POCT GLUCOSE: 305 MG/DL (ref 60–95)
POTASSIUM BLD-SCNC: 6.4 MMOL/L (ref 3.4–4.5)
POTASSIUM SERPL-SCNC: 6.3 MMOL/L (ref 3.5–5.1)
POTASSIUM SERPL-SCNC: 6.7 MMOL/L (ref 3.5–5.1)
PROT SERPL-MCNC: 7 G/DL (ref 6.4–8.2)
RBC # BLD AUTO: 2.93 M/UL (ref 4.6–6.2)
SODIUM BLD-SCNC: 135 MMOL/L (ref 136–145)
SODIUM SERPL-SCNC: 139 MMOL/L (ref 136–145)
TROPONIN I SERPL DL<=0.01 NG/ML-MCNC: 602.4 PG/ML
WBC # BLD AUTO: 5.08 K/UL (ref 4.5–11)

## 2023-12-21 PROCEDURE — 85025 COMPLETE CBC W/AUTO DIFF WBC: CPT | Performed by: NURSE PRACTITIONER

## 2023-12-21 PROCEDURE — 84132 ASSAY OF SERUM POTASSIUM: CPT | Mod: 91 | Performed by: NURSE PRACTITIONER

## 2023-12-21 PROCEDURE — 94640 AIRWAY INHALATION TREATMENT: CPT

## 2023-12-21 PROCEDURE — 27000221 HC OXYGEN, UP TO 24 HOURS

## 2023-12-21 PROCEDURE — 85014 HEMATOCRIT: CPT | Mod: 91

## 2023-12-21 PROCEDURE — 83605 ASSAY OF LACTIC ACID: CPT

## 2023-12-21 PROCEDURE — 96375 TX/PRO/DX INJ NEW DRUG ADDON: CPT

## 2023-12-21 PROCEDURE — 93010 ELECTROCARDIOGRAM REPORT: CPT | Performed by: HOSPITALIST

## 2023-12-21 PROCEDURE — 63600175 PHARM REV CODE 636 W HCPCS: Mod: GZ | Performed by: NURSE PRACTITIONER

## 2023-12-21 PROCEDURE — 84484 ASSAY OF TROPONIN QUANT: CPT | Performed by: NURSE PRACTITIONER

## 2023-12-21 PROCEDURE — 63600175 PHARM REV CODE 636 W HCPCS: Performed by: NURSE PRACTITIONER

## 2023-12-21 PROCEDURE — 96367 TX/PROPH/DG ADDL SEQ IV INF: CPT

## 2023-12-21 PROCEDURE — 82330 ASSAY OF CALCIUM: CPT

## 2023-12-21 PROCEDURE — 82962 GLUCOSE BLOOD TEST: CPT

## 2023-12-21 PROCEDURE — 25000003 PHARM REV CODE 250: Performed by: NURSE PRACTITIONER

## 2023-12-21 PROCEDURE — 25000242 PHARM REV CODE 250 ALT 637 W/ HCPCS: Performed by: NURSE PRACTITIONER

## 2023-12-21 PROCEDURE — 84295 ASSAY OF SERUM SODIUM: CPT

## 2023-12-21 PROCEDURE — 93005 ELECTROCARDIOGRAM TRACING: CPT

## 2023-12-21 PROCEDURE — 82947 ASSAY GLUCOSE BLOOD QUANT: CPT | Mod: 91

## 2023-12-21 PROCEDURE — 80053 COMPREHEN METABOLIC PANEL: CPT | Performed by: NURSE PRACTITIONER

## 2023-12-21 PROCEDURE — 84132 ASSAY OF SERUM POTASSIUM: CPT

## 2023-12-21 PROCEDURE — 94761 N-INVAS EAR/PLS OXIMETRY MLT: CPT | Mod: XB

## 2023-12-21 PROCEDURE — 82803 BLOOD GASES ANY COMBINATION: CPT

## 2023-12-21 RX ORDER — CALCIUM GLUCONATE 20 MG/ML
1 INJECTION, SOLUTION INTRAVENOUS EVERY 10 MIN PRN
Status: DISCONTINUED | OUTPATIENT
Start: 2023-12-21 | End: 2023-12-21 | Stop reason: HOSPADM

## 2023-12-21 RX ORDER — CALCIUM GLUCONATE 20 MG/ML
1 INJECTION, SOLUTION INTRAVENOUS
Status: COMPLETED | OUTPATIENT
Start: 2023-12-21 | End: 2023-12-21

## 2023-12-21 RX ORDER — SODIUM BICARBONATE 1 MEQ/ML
50 SYRINGE (ML) INTRAVENOUS
Status: COMPLETED | OUTPATIENT
Start: 2023-12-21 | End: 2023-12-21

## 2023-12-21 RX ADMIN — DEXTROSE MONOHYDRATE 25 G: 25 INJECTION, SOLUTION INTRAVENOUS at 08:12

## 2023-12-21 RX ADMIN — HUMAN INSULIN 9.98 UNITS: 100 INJECTION, SOLUTION SUBCUTANEOUS at 08:12

## 2023-12-21 RX ADMIN — IPRATROPIUM BROMIDE AND ALBUTEROL SULFATE 3 ML: .5; 3 SOLUTION RESPIRATORY (INHALATION) at 03:12

## 2023-12-21 RX ADMIN — AZITHROMYCIN 500 MG: 500 INJECTION, POWDER, LYOPHILIZED, FOR SOLUTION INTRAVENOUS at 12:12

## 2023-12-21 RX ADMIN — CALCIUM GLUCONATE 1 G: 20 INJECTION, SOLUTION INTRAVENOUS at 08:12

## 2023-12-21 RX ADMIN — IPRATROPIUM BROMIDE AND ALBUTEROL SULFATE 3 ML: .5; 3 SOLUTION RESPIRATORY (INHALATION) at 07:12

## 2023-12-21 RX ADMIN — SODIUM BICARBONATE 50 MEQ: 84 INJECTION INTRAVENOUS at 07:12

## 2023-12-21 NOTE — ED PROVIDER NOTES
Encounter Date: 12/20/2023       History     Chief Complaint   Patient presents with    Flank Pain     Presented pov driven by wife with c/o onset of right flank pain that started about an hour ago. States was diagnosed with pneumonia and started treatment on 12/18. Reports has been SOB but is better than it was. Has been taking breathing treatments every 4 hours and taking antibiotics as prescribed. Reports onset of pain is similar to previous episode of kidney stones, although it has been 20 year since his last stone. States nausea with no vomiting or abd pain. Also denies chest pain. PMH of CKD, HTN, DM, CAD, MI, CVA.        Review of patient's allergies indicates:   Allergen Reactions    Brilinta [ticagrelor] Nausea And Vomiting    Demerol [meperidine] Nausea And Vomiting    Iodinated contrast media Rash     Other reaction(s): Unknown    Sulfa (sulfonamide antibiotics) Rash     Cold sweats    Sulfamethoxazole-trimethoprim Rash     Past Medical History:   Diagnosis Date    Diabetes mellitus     Heart attack     Hypertension     Stroke      Past Surgical History:   Procedure Laterality Date    knee scope Bilateral     MYRINGOTOMY WITH INSERTION OF VENTILATION TUBE Right 6/17/2022    Procedure: RIGHT MYRINGOTOMY, WITH TYMPANOSTOMY TUBE INSERTION;  Surgeon: Rufus Lynn MD;  Location: [facility];  Service: ENT;  Laterality: Right;    NECK SURGERY       History reviewed. No pertinent family history.  Social History     Tobacco Use    Smoking status: Never    Smokeless tobacco: Never   Substance Use Topics    Alcohol use: Never    Drug use: Never     Review of Systems   Constitutional:  Positive for activity change, appetite change, chills and fatigue. Negative for fever.   HENT:  Positive for congestion.    Respiratory:  Positive for cough, shortness of breath and wheezing. Negative for chest tightness.    Cardiovascular:  Negative for chest pain, palpitations and leg swelling.   Gastrointestinal:  Positive for  nausea. Negative for abdominal pain, diarrhea and vomiting.   Genitourinary:  Positive for flank pain (right). Negative for dysuria.   Musculoskeletal:  Positive for back pain (right flank/back).   Skin:  Negative for color change and rash.   Neurological:  Positive for weakness (genralized).       Physical Exam     Initial Vitals [12/20/23 2030]   BP Pulse Resp Temp SpO2   (!) 161/67 104 20 97.3 °F (36.3 °C) (!) 94 %      MAP       --         Physical Exam    Nursing note and vitals reviewed.  Constitutional: He appears well-developed and well-nourished.   HENT:   Head: Normocephalic.   Right Ear: External ear normal.   Left Ear: External ear normal.   Nose: Nose normal.   Mouth/Throat: Oropharynx is clear and moist.   Eyes: Conjunctivae and EOM are normal. Pupils are equal, round, and reactive to light.   Neck: Neck supple.   Normal range of motion.  Cardiovascular:  Regular rhythm, normal heart sounds and intact distal pulses.           No murmur heard.  Pulmonary/Chest: He has rhonchi.   SOB with exertion   Abdominal: Abdomen is soft. Bowel sounds are normal. There is no abdominal tenderness.   Musculoskeletal:         General: No tenderness or edema. Normal range of motion.      Cervical back: Normal range of motion and neck supple.     Neurological: He is alert and oriented to person, place, and time. He has normal strength. GCS score is 15. GCS eye subscore is 4. GCS verbal subscore is 5. GCS motor subscore is 6.   Skin: Skin is warm and dry. Capillary refill takes less than 2 seconds.   Psychiatric: He has a normal mood and affect.         Medical Screening Exam   See Full Note    ED Course   Procedures  Labs Reviewed   COMPREHENSIVE METABOLIC PANEL - Abnormal; Notable for the following components:       Result Value    Potassium 5.5 (*)     Chloride 110 (*)     Anion Gap 18 (*)     Glucose 185 (*)     BUN 65 (*)     Creatinine 4.95 (*)     eGFR 12 (*)     All other components within normal limits    URINALYSIS, REFLEX TO URINE CULTURE - Abnormal; Notable for the following components:    Protein,  (*)     Glucose,  (*)     All other components within normal limits   CBC WITH DIFFERENTIAL - Abnormal; Notable for the following components:    RBC 3.47 (*)     Hemoglobin 9.5 (*)     Hematocrit 31.7 (*)     MCHC 30.0 (*)     Neutrophils % 68.8 (*)     Lymphocytes % 11.9 (*)     Lymphocytes, Absolute 0.96 (*)     Monocytes % 7.8 (*)     Eosinophils % 11.3 (*)     Eosinophils, Absolute 0.91 (*)     All other components within normal limits   TROPONIN I - Abnormal; Notable for the following components:    Troponin I High Sensitivity 723.7 (*)     All other components within normal limits   NT-PRO NATRIURETIC PEPTIDE - Abnormal; Notable for the following components:    ProBNP 11,869 (*)     All other components within normal limits   TROPONIN I - Abnormal; Notable for the following components:    Troponin I High Sensitivity 652.7 (*)     All other components within normal limits   URINALYSIS, MICROSCOPIC - Abnormal; Notable for the following components:    Yeast, UA Rare (*)     All other components within normal limits   TROPONIN I - Abnormal; Notable for the following components:    Troponin I High Sensitivity 602.4 (*)     All other components within normal limits   COMPREHENSIVE METABOLIC PANEL - Abnormal; Notable for the following components:    Potassium 6.7 (*)     Chloride 108 (*)     CO2 20 (*)     Anion Gap 18 (*)     Glucose 321 (*)     BUN 75 (*)     Creatinine 5.28 (*)     Calcium 8.2 (*)     Albumin 3.3 (*)     eGFR 11 (*)     All other components within normal limits   CBC WITH DIFFERENTIAL - Abnormal; Notable for the following components:    RBC 2.93 (*)     Hemoglobin 8.0 (*)     Hematocrit 27.4 (*)     MCHC 29.2 (*)     Platelet Count 138 (*)     MPV 8.9 (*)     Neutrophils % 93.1 (*)     Lymphocytes % 4.7 (*)     Lymphocytes, Absolute 0.24 (*)     Monocytes % 1.4 (*)     Eosinophils % 0.6  (*)     All other components within normal limits   POTASSIUM - Abnormal; Notable for the following components:    Potassium 6.3 (*)     All other components within normal limits   RAPID INFLUENZA A/B - Normal   LACTIC ACID, PLASMA - Normal   LIPASE - Normal   MAGNESIUM - Normal   SARS-COV-2 RNA AMPLIFICATION, QUAL - Normal    Narrative:     Negative SARS-CoV results should not be used as the sole basis for treatment or patient management decisions; negative results should be considered in the context of a patient's recent exposures, history and the presene of clinical signs and symptoms consistent with COVID-19.  Negative results should be treated as presumptive and confirmed by molecular assay, if necessary for patient management.   CULTURE, BLOOD   CULTURE, BLOOD   CBC W/ AUTO DIFFERENTIAL    Narrative:     The following orders were created for panel order CBC auto differential.  Procedure                               Abnormality         Status                     ---------                               -----------         ------                     CBC with Differential[[id number]]       Abnormal            Final result                 Please view results for these tests on the individual orders.   CBC W/ AUTO DIFFERENTIAL    Narrative:     The following orders were created for panel order CBC Auto Differential.  Procedure                               Abnormality         Status                     ---------                               -----------         ------                     CBC with Differential[[id number]]       Abnormal            Final result                 Please view results for these tests on the individual orders.   POCT GLUCOSE MONITORING CONTINUOUS     EKG Readings: (Independently Interpreted)   Initial Reading: No STEMI. Rhythm: Normal Sinus Rhythm. Heart Rate: 96. T Waves Flipped: V5, V6, I, II and AVF.   Reviewed at 2058. No previous for comparison.       Imaging Results              CT  Chest Abdomen Pelvis Without Contrast (XPD) (In process)                      Medications   albuterol-ipratropium 2.5 mg-0.5 mg/3 mL nebulizer solution 3 mL (3 mLs Nebulization Given 12/21/23 0717)   calcium gluconate 1 g in NS IVPB (premixed) (has no administration in time range)     And   calcium gluconate 1 g in NS IVPB (premixed) (has no administration in time range)   sodium bicarbonate 8.4 % (1 mEq/mL) injection 50 mEq (has no administration in time range)   dextrose 50% injection 25 g (has no administration in time range)     And   dextrose 50% injection 25 g (has no administration in time range)     And   insulin regular injection 9.98 Units 0.0998 mL (has no administration in time range)   morphine injection 4 mg (4 mg Intravenous Given 12/20/23 2039)   ondansetron injection 4 mg (4 mg Intravenous Given 12/20/23 2039)   aspirin chewable tablet 324 mg (324 mg Oral Given 12/20/23 2110)   methylPREDNISolone sodium succinate injection 125 mg (125 mg Intravenous Given 12/20/23 2122)   cefTRIAXone (ROCEPHIN) 1 g in dextrose 5 % in water (D5W) 100 mL IVPB (MB+) (0 g Intravenous Stopped 12/20/23 2155)   albuterol-ipratropium 2.5 mg-0.5 mg/3 mL nebulizer solution 3 mL (3 mLs Nebulization Given 12/20/23 2124)   albuterol nebulizer solution 2.5 mg (2.5 mg Nebulization Given 12/20/23 2132)   furosemide injection 40 mg (40 mg Intravenous Given 12/20/23 2140)   nitroGLYCERIN 2% TD oint ointment 1 inch (1 inch Topical (Top) Given 12/20/23 2153)   morphine injection 2 mg (2 mg Intravenous Given 12/20/23 2153)   albuterol-ipratropium 2.5 mg-0.5 mg/3 mL nebulizer solution 3 mL (3 mLs Nebulization Given 12/20/23 2321)   enoxaparin injection 30 mg (30 mg Subcutaneous Given 12/20/23 2327)   azithromycin (ZITHROMAX) 500 mg in dextrose 5 % (D5W) 250 mL IVPB (Vial-Mate) (0 mg Intravenous Stopped 12/21/23 0120)     Medical Decision Making  Presented pov driven by wife with c/o onset of right flank pain that started about an hour  ago. States was diagnosed with pneumonia and started treatment on 12/18. Reports has been SOB but is better than it was. Has been taking breathing treatments every 4 hours and taking antibiotics as prescribed. Reports onset of pain is similar to previous episode of kidney stones, although it has been 20 year since his last stone. States nausea with no vomiting or abd pain. Also denies chest pain. PMH of CKD, HTN, DM, CAD, MI, CVA.    Amount and/or Complexity of Data Reviewed  Labs: ordered. Decision-making details documented in ED Course.     Details: Initial troponin 723, #2 652, #3 602. BNP > 94552, WBC 8080 with H&H 9.5 and 31.7, [id number], lactate 0.8, lipase 28, Mg 1.7. K+ 5.5, BUN 65, Creatinine 4.95 (baseline 4.6)  Radiology: ordered. Decision-making details documented in ED Course.  Discussion of management or test interpretation with external provider(s): Discussed pt's case with Dr. Saravia.    Risk  OTC drugs.  Prescription drug management.  Decision regarding hospitalization.               ED Course as of 12/21/23 0735   Wed Dec 20, 2023   2040 Went to CT c/o SOB after getting there. O2 started at 3L/min per NC. [DP]   2045 Returned from CT. C/o right flank pain 8/10 with obvious SOB. Morphine 4 mg with ondansetron 4 mg IVP.  [DP]   2057 WBC: 8.08 [DP]   2057 Hemoglobin(!): 9.5 [DP]   2057 Hematocrit(!): 31.7 [DP]   2057 Platelet Count: 163 [DP]   2057 Neutrophils Relative(!): 68.8 [DP]   2115  mg po, Rocephin 1 Gm IVPB, Lasix 40 mg IVP, Solumedrol 125mg IVP given.   [DP]   2126 Lactate, Geraldo: 0.8 [DP]   2127 Troponin I High Sensitivity(!!): 723.7 [DP]   2127 Lipase: 28 [DP]   2127 Magnesium : 1.7 [DP]   2127 Sodium: 143 [DP]   2127 Potassium(!): 5.5 [DP]   2127 BUN(!): 65 [DP]   2127 Creatinine(!): 4.95 [DP]   2127 ALT: 28 [DP]   2127 AST: 21 [DP]   2130 Duoneb  [DP]   2131 Ct chest abd pelvis shows bilat pleural effusion, punctate 2mm stone right renal pelvis. [DP]   2140 NT-proBNP(!): 11,869 [DP]    2140 Influenza A: Negative [DP]   2140 Influenza B, Molecular: Negative [DP]   2140 ID NOW COVID-19, ([facility]): Negative [DP]   2140 Duoneb given. O2 sat 90% prior to treatment. Increased wheezing and air movement after treatment. [DP]   2150 Albuterol neb given. Improved air movement. O2 changed to venti mask [DP]   2152 EKG faxed from [facility]. Done on 12/15/23. No changes noted compared with today's EKG. [DP]   2205 Breathing easier. O2 per venti mask due to pt mouth breathing. Lungs noted with wheezes but improved air movement. O2 SAT 97%. [DP]   2306 Troponin I High Sensitivity(!!): 652.7 [DP]   2307 Dr. Saravia accepted to [facility]. No beds open there at present. Will continue to monitor here until bed available. [DP]   2339 Awaiting bed at [facility]. Instructed pt and wife that no bed is available at [facility] at present and pt will continue to be treated here until bed is available or his status changes. Instructed that we may try to transfer elsewhere outside of [place] if they want. Pt and wife states that they wish to go to a [facility] at this time and will wait for a bed at [facility]. O2 sat 93-96% on Venti at 40%. /72, HR 88. [DP]   2355 Discussed case with Dr. Saravia, [facility] Hospitalist. He agrees with current treatment plan here. [DP]   u Dec 21, 2023   0050 Troponin I High Sensitivity(!!): 602.4 [DP]   0404 Pt resting comfortably. O2 sat 96% on Venti mask.  [DP]   0628 WBC: 5.08 [DP]   0628 Hemoglobin(!): 8.0 [DP]   0628 MCV: 93.5 [DP]   0628 Platelet Count(!): 138 [DP]   0628 Sodium: 139 [DP]   0629 Chloride(!): 108 [DP]   0629 CO2(!): 20 [DP]   0629 BUN(!): 75 [DP]   0629 Creatinine(!): 5.28 [DP]   0629 ALT: 22 [DP]   0629 AST: 21 [DP]   0638 Pt alert. Breathing better. O2 sat 98% on O2 and 92% on RA. Restarted on NC. [DP]   0639 Potassium 6.29.   [DP]   0646 Potassium(!): 6.3 [DP]   0700 Potassium is now 6.3 (repeated to verify accuracy).  Reached out to Dr Leon this morning, waiting response.  Repeat EKG  ordered to assess for changes.  Neb ordered.  Messaged pharmacy to see if Lokelma was available, which we do not have.   [AG]   0715 Spoke with Dr Starkey at [facility] who has accepted him to their ER.  Reviewed the AM lab, he will get meds for the hyperkalemia. [AG]      ED Course User Index  [AG] Damaris Elizondo FNP  [DP] Jasmyn Calixto NP                           Clinical Impression:   Final diagnoses:  [R06.02] Shortness of breath  [R10.9] Right flank pain  [I21.4] NSTEMI (non-ST elevated myocardial infarction) (Primary)  [N17.9, N18.4] Acute renal failure superimposed on stage 4 chronic kidney disease, unspecified acute renal failure type  [J18.9] Pneumonia of left lower lobe due to infectious organism  [R09.02] Hypoxia  [E87.5] Hyperkalemia        ED Disposition Condition    Transfer to Another Facility Stable                Damaris Elizondo FNP  12/21/23 0705       Damaris Elizondo FNP  12/21/23 0792

## 2023-12-27 LAB
BACTERIA BLD CULT: NORMAL
BACTERIA BLD CULT: NORMAL

## 2023-12-27 NOTE — ADDENDUM NOTE
Encounter addended by: Gavi Brown on: 12/27/2023 12:08 PM   Actions taken: SmartForm saved, Flowsheet accepted, Charge Capture section accepted

## 2023-12-29 NOTE — ADDENDUM NOTE
Encounter addended by: Rachel Thompson on: 12/29/2023 10:43 AM   Actions taken: Charge Capture section accepted

## 2024-01-13 ENCOUNTER — HOSPITAL ENCOUNTER (EMERGENCY)
Facility: HOSPITAL | Age: 69
Discharge: HOME | End: 2024-01-13
Payer: MEDICARE

## 2024-01-13 VITALS
HEART RATE: 78 BPM | OXYGEN SATURATION: 100 % | HEIGHT: 68 IN | SYSTOLIC BLOOD PRESSURE: 201 MMHG | WEIGHT: 203 LBS | DIASTOLIC BLOOD PRESSURE: 78 MMHG | BODY MASS INDEX: 30.77 KG/M2 | TEMPERATURE: 98 F | RESPIRATION RATE: 18 BRPM

## 2024-01-13 DIAGNOSIS — R42 DIZZINESS: ICD-10-CM

## 2024-01-13 DIAGNOSIS — S61.217A LACERATION OF LEFT LITTLE FINGER WITHOUT FOREIGN BODY WITHOUT DAMAGE TO NAIL, INITIAL ENCOUNTER: Primary | ICD-10-CM

## 2024-01-13 LAB
ALBUMIN SERPL BCP-MCNC: 2.6 G/DL (ref 3.5–5)
ALBUMIN/GLOB SERPL: 0.6 {RATIO}
ALP SERPL-CCNC: 91 U/L (ref 45–115)
ALT SERPL W P-5'-P-CCNC: 18 U/L (ref 16–61)
ANION GAP SERPL CALCULATED.3IONS-SCNC: 10 MMOL/L (ref 7–16)
AST SERPL W P-5'-P-CCNC: 16 U/L (ref 15–37)
BILIRUB SERPL-MCNC: 0.5 MG/DL (ref ?–1.2)
BUN SERPL-MCNC: 47 MG/DL (ref 7–18)
BUN/CREAT SERPL: 8 (ref 6–20)
CALCIUM SERPL-MCNC: 9 MG/DL (ref 8.5–10.1)
CHLORIDE SERPL-SCNC: 99 MMOL/L (ref 98–107)
CO2 SERPL-SCNC: 33 MMOL/L (ref 21–32)
CREAT SERPL-MCNC: 6.15 MG/DL (ref 0.7–1.3)
EGFR (NO RACE VARIABLE) (RUSH/TITUS): 9 ML/MIN/1.73M2
EOSINOPHIL NFR BLD MANUAL: 15 % (ref 1–4)
ERYTHROCYTE [DISTWIDTH] IN BLOOD BY AUTOMATED COUNT: 13.8 % (ref 11.5–14.5)
GLOBULIN SER-MCNC: 4.3 G/DL (ref 2–4)
GLUCOSE SERPL-MCNC: 211 MG/DL (ref 74–106)
HCT VFR BLD AUTO: 31 % (ref 40–54)
HGB BLD-MCNC: 9.6 G/DL (ref 13.5–18)
LYMPHOCYTES NFR BLD MANUAL: 20 % (ref 27–41)
MCH RBC QN AUTO: 28.2 PG (ref 27–31)
MCHC RBC AUTO-ENTMCNC: 31 G/DL (ref 32–36)
MCV RBC AUTO: 91.2 FL (ref 80–96)
MONOCYTES NFR BLD MANUAL: 7 % (ref 2–6)
MPC BLD CALC-MCNC: 8.4 FL (ref 9.4–12.4)
NEUTS SEG NFR BLD MANUAL: 58 % (ref 50–62)
NRBC BLD MANUAL-RTO: ABNORMAL %
PLATELET # BLD AUTO: 182 K/UL (ref 150–400)
PLATELET MORPHOLOGY: NORMAL
POTASSIUM SERPL-SCNC: 3.9 MMOL/L (ref 3.5–5.1)
PROT SERPL-MCNC: 6.9 G/DL (ref 6.4–8.2)
RBC # BLD AUTO: 3.4 M/UL (ref 4.6–6.2)
RBC MORPH BLD: NORMAL
SODIUM SERPL-SCNC: 138 MMOL/L (ref 136–145)
TROPONIN I SERPL DL<=0.01 NG/ML-MCNC: 43.7 PG/ML
WBC # BLD AUTO: 5.4 K/UL (ref 4.5–11)

## 2024-01-13 PROCEDURE — 85025 COMPLETE CBC W/AUTO DIFF WBC: CPT | Performed by: NURSE PRACTITIONER

## 2024-01-13 PROCEDURE — 84484 ASSAY OF TROPONIN QUANT: CPT | Performed by: NURSE PRACTITIONER

## 2024-01-13 PROCEDURE — 99284 EMERGENCY DEPT VISIT MOD MDM: CPT | Mod: 25

## 2024-01-13 PROCEDURE — 93005 ELECTROCARDIOGRAM TRACING: CPT

## 2024-01-13 PROCEDURE — 93010 ELECTROCARDIOGRAM REPORT: CPT | Performed by: HOSPITALIST

## 2024-01-13 PROCEDURE — 99284 EMERGENCY DEPT VISIT MOD MDM: CPT | Mod: 25,GF

## 2024-01-13 PROCEDURE — 12001 RPR S/N/AX/GEN/TRNK 2.5CM/<: CPT

## 2024-01-13 PROCEDURE — 25000003 PHARM REV CODE 250: Performed by: NURSE PRACTITIONER

## 2024-01-13 PROCEDURE — 80053 COMPREHEN METABOLIC PANEL: CPT | Performed by: NURSE PRACTITIONER

## 2024-01-13 RX ORDER — LIDOCAINE HYDROCHLORIDE 10 MG/ML
10 INJECTION, SOLUTION EPIDURAL; INFILTRATION; INTRACAUDAL; PERINEURAL
Status: COMPLETED | OUTPATIENT
Start: 2024-01-13 | End: 2024-01-13

## 2024-01-13 RX ORDER — LIDOCAINE HYDROCHLORIDE 20 MG/ML
10 INJECTION, SOLUTION INFILTRATION; PERINEURAL
Status: DISCONTINUED | OUTPATIENT
Start: 2024-01-13 | End: 2024-01-13

## 2024-01-13 RX ADMIN — BACITRACIN ZINC, NEOMYCIN, POLYMYXIN B 1 EACH: 400; 3.5; 5 OINTMENT TOPICAL at 01:01

## 2024-01-13 RX ADMIN — LIDOCAINE HYDROCHLORIDE 100 MG: 10 INJECTION, SOLUTION EPIDURAL; INFILTRATION; INTRACAUDAL; PERINEURAL at 01:01

## 2024-01-13 NOTE — ED PROVIDER NOTES
Encounter Date: 1/13/2024       History     Chief Complaint   Patient presents with    Laceration     Left hand 5th finger     Patient presents to the ED with his spouse. Patient reports when he got up from the bed, he had a dizzy spell and when he caught himself from falling, he cut his 5th finger on his left hand. Denies any head injury or any other injuries. Patient's spouse states they called the dialysis place and they told them that they may have taken too much off him yesterday causing the dizziness, they instructed to drink some water and see how he feels. Patient denies any symptoms of dizziness at this time. Patient was recently started on dialysis in the past 3 weeks. Denies any CP or SOB.     The history is provided by the patient and the spouse.     Review of patient's allergies indicates:   Allergen Reactions    Brilinta [ticagrelor] Nausea And Vomiting    Demerol [meperidine] Nausea And Vomiting    Iodinated contrast media Rash     Other reaction(s): Unknown    Sulfa (sulfonamide antibiotics) Rash     Cold sweats    Sulfamethoxazole-trimethoprim Rash     Past Medical History:   Diagnosis Date    Diabetes mellitus     Heart attack     Hypertension     Stroke      Past Surgical History:   Procedure Laterality Date    knee scope Bilateral     MYRINGOTOMY WITH INSERTION OF VENTILATION TUBE Right 6/17/2022    Procedure: RIGHT MYRINGOTOMY, WITH TYMPANOSTOMY TUBE INSERTION;  Surgeon: Rufus Lnyn MD;  Location: [facility];  Service: ENT;  Laterality: Right;    NECK SURGERY       History reviewed. No pertinent family history.  Social History     Tobacco Use    Smoking status: Never    Smokeless tobacco: Never   Substance Use Topics    Alcohol use: Never    Drug use: Never     Review of Systems   Constitutional: Negative.    Respiratory: Negative.     Cardiovascular: Negative.    Musculoskeletal: Negative.    Skin:  Positive for wound (left hand 5th finger).   Neurological: Negative.     Psychiatric/Behavioral: Negative.     All other systems reviewed and are negative.      Physical Exam     Initial Vitals [01/13/24 1240]   BP Pulse Resp Temp SpO2   (!) 111/38 96 18 97.5 °F (36.4 °C) 97 %      MAP       --         Physical Exam    Vitals reviewed.  Constitutional: He appears well-developed and well-nourished.   HENT:   Head: Normocephalic and atraumatic.   Eyes: Pupils are equal, round, and reactive to light.   Cardiovascular:  Normal rate, regular rhythm, normal heart sounds and intact distal pulses.           Pulmonary/Chest: Breath sounds normal.   Musculoskeletal:         General: Normal range of motion.     Neurological: He is alert and oriented to person, place, and time. He has normal strength. GCS score is 15. GCS eye subscore is 4. GCS verbal subscore is 5. GCS motor subscore is 6.   Skin: Skin is warm and dry. Capillary refill takes less than 2 seconds.        Psychiatric: He has a normal mood and affect. His behavior is normal. Judgment and thought content normal.         Medical Screening Exam   See Full Note    ED Course   Lac Repair    Date/Time: 1/13/2024 2:00 PM    Performed by: Shahnaz Ross FNP  Authorized by: Shahnaz Ross FNP    Consent:     Consent obtained:  Verbal    Consent given by:  Patient    Risks discussed:  Pain and poor wound healing    Alternatives discussed:  No treatment and observation  Universal protocol:     Procedure explained and questions answered to patient or proxy's satisfaction: yes      Relevant documents present and verified: yes      Immediately prior to procedure, a time out was called: yes      Patient identity confirmed:  Verbally with patient  Anesthesia:     Anesthesia method:  Local infiltration    Local anesthetic:  Lidocaine 1% w/o epi  Laceration details:     Location:  Finger    Finger location:  L small finger    Length (cm):  2  Exploration:     Hemostasis achieved with:  Direct pressure    Contaminated: no    Treatment:     Area  cleansed with:  Povidone-iodine and saline    Amount of cleaning:  Standard    Debridement:  None    Undermining:  None  Skin repair:     Repair method:  Sutures    Suture size:  4-0    Suture material:  Nylon    Suture technique:  Simple interrupted    Number of sutures:  5  Approximation:     Approximation:  Close  Repair type:     Repair type:  Simple  Post-procedure details:     Dressing:  Antibiotic ointment and non-adherent dressing    Procedure completion:  Tolerated well, no immediate complications    Labs Reviewed   COMPREHENSIVE METABOLIC PANEL - Abnormal; Notable for the following components:       Result Value    CO2 33 (*)     Glucose 211 (*)     BUN 47 (*)     Creatinine 6.15 (*)     Albumin 2.6 (*)     Globulin 4.3 (*)     eGFR 9 (*)     All other components within normal limits   CBC WITH DIFFERENTIAL - Abnormal; Notable for the following components:    RBC 3.40 (*)     Hemoglobin 9.6 (*)     Hematocrit 31.0 (*)     MCHC 31.0 (*)     MPV 8.4 (*)     All other components within normal limits   MANUAL DIFFERENTIAL - Abnormal; Notable for the following components:    Lymphocytes, Man % 20 (*)     Monocytes, Man % 7 (*)     Eosinophils, Man % 15 (*)     All other components within normal limits   TROPONIN I - Normal   CBC W/ AUTO DIFFERENTIAL    Narrative:     The following orders were created for panel order CBC auto differential.  Procedure                               Abnormality         Status                     ---------                               -----------         ------                     CBC with Differential[[id number]]       Abnormal            Final result               Manual Differential[[id number]]         Abnormal            Final result                 Please view results for these tests on the individual orders.          Imaging Results    None          Medications   neomycin-bacitracnZn-polymyxnB packet (1 each Topical (Top) Given 1/13/24 0384)   LIDOcaine (PF) 10 mg/ml (1%)  injection 100 mg (100 mg Infiltration Given 1/13/24 8494)     Medical Decision Making  Amount and/or Complexity of Data Reviewed  Labs: ordered.     Details: WBC 5.40, H/H 9.6/31.0, Creat 6.15, BUN 47, Troponin 43.7. Patient has chronic kidney failure and is on dialysis                                      Clinical Impression:   Final diagnoses:  [R42] Dizziness  [S61.217A] Laceration of left little finger without foreign body without damage to nail, initial encounter (Primary)        ED Disposition Condition    Discharge Stable          ED Prescriptions    None       Follow-up Information       Follow up With Specialties Details Why Contact Info    [facility] - Emergency Department Emergency Medicine In 2 days For wound re-check [address] [id number] [phone]    [facility] - Emergency Department Emergency Medicine  For suture removal [address] [id number] [phone]             Shahnaz Ross, FNP  01/13/24 7733

## 2024-01-15 ENCOUNTER — HOSPITAL ENCOUNTER (EMERGENCY)
Facility: HOSPITAL | Age: 69
Discharge: HOME | End: 2024-01-15
Payer: MEDICARE

## 2024-01-15 VITALS
SYSTOLIC BLOOD PRESSURE: 182 MMHG | BODY MASS INDEX: 32.65 KG/M2 | RESPIRATION RATE: 17 BRPM | DIASTOLIC BLOOD PRESSURE: 71 MMHG | WEIGHT: 208 LBS | HEIGHT: 67 IN | TEMPERATURE: 98 F | OXYGEN SATURATION: 99 % | HEART RATE: 85 BPM

## 2024-01-15 DIAGNOSIS — Z51.89 ENCOUNTER FOR WOUND RE-CHECK: Primary | ICD-10-CM

## 2024-01-15 PROCEDURE — 99281 EMR DPT VST MAYX REQ PHY/QHP: CPT

## 2024-01-15 NOTE — ED PROVIDER NOTES
Encounter Date: 1/15/2024       History     Chief Complaint   Patient presents with    Wound Check     Pt in for two day wound check for a laceration on left 5th finger.      68 year old  male presents to the ER for wound check.  Pt has #5 sutures placed to left 5th finger 2 days ago here in our ER.  He has had the wound wrapped and splinted since the repair. Denies fever, increased pain.     The history is provided by the patient.   Wound Check   He was treated in the ED 2 to 3 days ago. Previous treatment in the ED includes Laceration repair. There has been no treatment since the wound repair. There has been no drainage from the wound. There is no redness present. There is no swelling present. The pain has improved. He has no difficulty moving the affected extremity or digit.     Review of patient's allergies indicates:   Allergen Reactions    Brilinta [ticagrelor] Nausea And Vomiting    Demerol [meperidine] Nausea And Vomiting    Iodinated contrast media Rash     Other reaction(s): Unknown    Sulfa (sulfonamide antibiotics) Rash     Cold sweats    Sulfamethoxazole-trimethoprim Rash     Past Medical History:   Diagnosis Date    Diabetes mellitus     Heart attack     Hypertension     Stroke      Past Surgical History:   Procedure Laterality Date    knee scope Bilateral     MYRINGOTOMY WITH INSERTION OF VENTILATION TUBE Right 6/17/2022    Procedure: RIGHT MYRINGOTOMY, WITH TYMPANOSTOMY TUBE INSERTION;  Surgeon: Rufus Lynn MD;  Location: [facility];  Service: ENT;  Laterality: Right;    NECK SURGERY       No family history on file.  Social History     Tobacco Use    Smoking status: Never    Smokeless tobacco: Never   Substance Use Topics    Alcohol use: Never    Drug use: Never     Review of Systems   Constitutional:  Negative for fever.   HENT:  Negative for sore throat.    Respiratory:  Negative for shortness of breath.    Cardiovascular:  Negative for chest pain.   Gastrointestinal:  Negative for  nausea.   Genitourinary:  Negative for dysuria.   Musculoskeletal:  Negative for back pain.   Skin:  Positive for wound. Negative for rash.   Neurological:  Negative for weakness.   Hematological:  Does not bruise/bleed easily.       Physical Exam     Initial Vitals   BP Pulse Resp Temp SpO2   01/15/24 1223 01/15/24 1223 01/15/24 1223 01/15/24 1220 01/15/24 1223   (!) 182/71 85 17 97.7 °F (36.5 °C) 99 %      MAP       --                Physical Exam    Nursing note and vitals reviewed.  Constitutional: He appears well-developed and well-nourished. He is not diaphoretic. No distress.   HENT:   Head: Normocephalic and atraumatic.   Eyes: Pupils are equal, round, and reactive to light.   Neck: Neck supple.   Cardiovascular:  Normal rate, regular rhythm, normal heart sounds and intact distal pulses.     Exam reveals no gallop and no friction rub.       No murmur heard.  Pulmonary/Chest: Breath sounds normal. No respiratory distress. He has no wheezes. He has no rhonchi. He has no rales. He exhibits no tenderness.   Musculoskeletal:      Cervical back: Neck supple.      Comments: Suture to the left 5th finger remain intact.  Wound edges well approximated.  No signs of redness, swelling or drainage.      Neurological: He is alert and oriented to person, place, and time. GCS score is 15. GCS eye subscore is 4. GCS verbal subscore is 5. GCS motor subscore is 6.   Skin: Skin is warm and dry. Capillary refill takes less than 2 seconds.   Psychiatric: He has a normal mood and affect.         Medical Screening Exam   See Full Note    ED Course   Procedures  Labs Reviewed - No data to display       Imaging Results    None          Medications - No data to display  Medical Decision Making  Wound appears stable.  Will have patient being wound care at home and wear the splint a few more days.  Will give strict return precautions for signs of infection.     Problems Addressed:  Encounter for wound re-check: acute illness or  injury                                      Clinical Impression:   Final diagnoses:  [Z51.89] Encounter for wound re-check (Primary)        ED Disposition Condition    Discharge Stable          ED Prescriptions    None       Follow-up Information       Follow up With Specialties Details Why Contact Info    [facility] - Emergency Department Emergency Medicine Go in 10 days For wound re-check, For suture removal [address] [id number]  [phone]             Damaris Elizondo, OLIVER  01/15/24 1229       Damaris Elizondo, OLIVER  01/15/24 1236

## 2024-01-18 NOTE — ADDENDUM NOTE
Encounter addended by: Sherita Yoon on: 1/18/2024 12:00 PM   Actions taken: Charge Capture section accepted

## 2024-03-19 ENCOUNTER — TELEPHONE (OUTPATIENT)
Dept: TRANSPLANT | Facility: CLINIC | Age: 69
End: 2024-03-19

## 2024-03-19 ENCOUNTER — DOCUMENTATION ONLY (OUTPATIENT)
Dept: TRANSPLANT | Facility: CLINIC | Age: 69
End: 2024-03-19

## 2024-03-19 NOTE — TELEPHONE ENCOUNTER
----- Message from Angela Elmore MD sent at 3/19/2024  9:49 AM CDT -----      To me, he is not candidate, he has brought to ER few time sin last 8 months. 2 ER visits were due to fall and leg laceration while being on anticoag med. In addition to all his comorbid conditions, he is also frail.   ----- Message -----  From: Abadie, Michelle, RN  Sent: 3/19/2024   9:31 AM CDT  To: Ace Hernandez MD; #    Good morning,     We have received a referral on the above patient. He is 69 y/o, BMI of 29, and on dialysis as of 12/31/23. His medical hx includes: DM II-insulin dependant, HTN, CVA-2012, CAD-on Plavix, Carotid endarterectomy. MI, HLD, PVD, CHF, BENITO-CPAP. On Xarelto 15mg daily and pravastatin.  CTSCAN on 12/20/23 revealed:   Atherosclerotic calcifications of the coronary arteries and great vessels noted.     Is patient eligible for RR clinic?     Thank you,   Katharine

## 2024-08-07 ENCOUNTER — OUTSIDE PLACE OF SERVICE (OUTPATIENT)
Dept: VASCULAR SURGERY | Facility: CLINIC | Age: 69
End: 2024-08-07
Payer: MEDICARE

## 2024-08-07 PROCEDURE — 99223 1ST HOSP IP/OBS HIGH 75: CPT | Mod: ,,, | Performed by: SURGERY

## 2024-12-27 DIAGNOSIS — Z20.828 EXPOSURE TO INFLUENZA: Primary | ICD-10-CM

## 2024-12-27 RX ORDER — OSELTAMIVIR PHOSPHATE 75 MG/1
75 CAPSULE ORAL DAILY
Qty: 7 CAPSULE | Refills: 0 | Status: SHIPPED | OUTPATIENT
Start: 2024-12-27 | End: 2025-01-03

## 2025-01-23 ENCOUNTER — LAB REQUISITION (OUTPATIENT)
Dept: LAB | Facility: HOSPITAL | Age: 70
End: 2025-01-23
Attending: FAMILY MEDICINE
Payer: MEDICARE

## 2025-01-23 DIAGNOSIS — M10.9 GOUT, UNSPECIFIED: ICD-10-CM

## 2025-01-23 DIAGNOSIS — D50.0 IRON DEFICIENCY ANEMIA SECONDARY TO BLOOD LOSS (CHRONIC): ICD-10-CM

## 2025-01-23 LAB
BASOPHILS # BLD AUTO: 0.05 K/UL (ref 0–0.2)
BASOPHILS NFR BLD AUTO: 0.7 % (ref 0–1)
DIFFERENTIAL METHOD BLD: ABNORMAL
EOSINOPHIL # BLD AUTO: 0.77 K/UL (ref 0–0.5)
EOSINOPHIL NFR BLD AUTO: 11.1 % (ref 1–4)
ERYTHROCYTE [DISTWIDTH] IN BLOOD BY AUTOMATED COUNT: 16.2 % (ref 11.5–14.5)
HCT VFR BLD AUTO: 28.7 % (ref 40–54)
HGB BLD-MCNC: 9.1 G/DL (ref 13.5–18)
IMM GRANULOCYTES # BLD AUTO: 0.08 K/UL (ref 0–0.04)
IMM GRANULOCYTES NFR BLD: 1.2 % (ref 0–0.4)
LYMPHOCYTES # BLD AUTO: 1.32 K/UL (ref 1–4.8)
LYMPHOCYTES NFR BLD AUTO: 19 % (ref 27–41)
MCH RBC QN AUTO: 30 PG (ref 27–31)
MCHC RBC AUTO-ENTMCNC: 31.7 G/DL (ref 32–36)
MCV RBC AUTO: 94.7 FL (ref 80–96)
MONOCYTES # BLD AUTO: 0.55 K/UL (ref 0–0.8)
MONOCYTES NFR BLD AUTO: 7.9 % (ref 2–6)
MPC BLD CALC-MCNC: 9.5 FL (ref 9.4–12.4)
NEUTROPHILS # BLD AUTO: 4.17 K/UL (ref 1.8–7.7)
NEUTROPHILS NFR BLD AUTO: 60.1 % (ref 53–65)
NRBC # BLD AUTO: 0 X10E3/UL
NRBC, AUTO (.00): 0 %
PLATELET # BLD AUTO: 133 K/UL (ref 150–400)
RBC # BLD AUTO: 3.03 M/UL (ref 4.6–6.2)
URATE SERPL-MCNC: 9.4 MG/DL (ref 3.5–7.2)
WBC # BLD AUTO: 6.94 K/UL (ref 4.5–11)

## 2025-01-23 PROCEDURE — 85025 COMPLETE CBC W/AUTO DIFF WBC: CPT | Performed by: FAMILY MEDICINE

## 2025-01-23 PROCEDURE — 84550 ASSAY OF BLOOD/URIC ACID: CPT | Performed by: FAMILY MEDICINE

## 2025-02-25 ENCOUNTER — OFFICE VISIT (OUTPATIENT)
Dept: VASCULAR SURGERY | Facility: CLINIC | Age: 70
End: 2025-02-25
Payer: MEDICARE

## 2025-02-25 VITALS
HEIGHT: 69 IN | BODY MASS INDEX: 31.84 KG/M2 | SYSTOLIC BLOOD PRESSURE: 131 MMHG | DIASTOLIC BLOOD PRESSURE: 43 MMHG | WEIGHT: 215 LBS | HEART RATE: 74 BPM

## 2025-02-25 DIAGNOSIS — I73.9 PVD (PERIPHERAL VASCULAR DISEASE): Primary | ICD-10-CM

## 2025-02-25 PROCEDURE — 99214 OFFICE O/P EST MOD 30 MIN: CPT | Mod: PBBFAC | Performed by: SURGERY

## 2025-02-25 PROCEDURE — 99999 PR PBB SHADOW E&M-EST. PATIENT-LVL IV: CPT | Mod: PBBFAC,,, | Performed by: SURGERY

## 2025-02-25 PROCEDURE — 99203 OFFICE O/P NEW LOW 30 MIN: CPT | Mod: S$PBB,,, | Performed by: SURGERY

## 2025-02-25 RX ORDER — CLINDAMYCIN HYDROCHLORIDE 300 MG/1
300 CAPSULE ORAL 3 TIMES DAILY
COMMUNITY

## 2025-02-25 RX ORDER — SEVELAMER CARBONATE 800 MG/1
800 TABLET, FILM COATED ORAL
COMMUNITY

## 2025-02-25 RX ORDER — LEVOFLOXACIN 250 MG/1
TABLET ORAL
COMMUNITY
Start: 2025-02-17

## 2025-02-25 NOTE — PROGRESS NOTES
"Subjective:       Patient ID: Isauro Spivey is a 69 y.o. male.    Chief Complaint: Follow-up (Check toes - sent over from Dr. Wren's office)  New patient for Dr. Lamas he is diabetic he is on peritoneal dialysis.  Had angiogram in August appeared to have two-vessel runoff right foot the anterior tib and peroneal December he had intervention of the left leg treating a tibioperoneal trunk peroneal single-vessel anterior tib into the foot plantar arch is not well visualized on either of the studies.  Right foot he was 2nd toe amputation of the slightly eschar at the tip of the amputation site.  He has got a great toe amputation on the left with some eschar but otherwise he has a clean no drainage no foul smell no cellulitis.  It is plan is get repeat ABIs and see if these are at least as good as they were prior to his angio is a there has been a precipitous falling may benefit from being restudy patient was agreeable his wife present    I have personally pulled up the films were reviewed both from August and December his angiogram in his interventions formulated monitored opinion  family history is not on file.  Past Medical History:   Diagnosis Date    Diabetes mellitus     Heart attack     Hypertension     Stroke       Past Surgical History:   Procedure Laterality Date    knee scope Bilateral     MYRINGOTOMY WITH INSERTION OF VENTILATION TUBE Right 6/17/2022    Procedure: RIGHT MYRINGOTOMY, WITH TYMPANOSTOMY TUBE INSERTION;  Surgeon: Rufus Lynn MD;  Location: [facility];  Service: ENT;  Laterality: Right;    NECK SURGERY         reports that he has never smoked. He has never used smokeless tobacco. He reports that he does not drink alcohol and does not use drugs.   HPI  Review of Systems      Objective:      BP (!) 131/43 (BP Location: Right forearm, Patient Position: Sitting)   Pulse 74   Ht 5' 9" (1.753 m)   Wt 97.5 kg (215 lb)   BMI 31.75 kg/m²    Physical Exam  Constitutional:       Appearance: " Normal appearance.   Cardiovascular:      Rate and Rhythm: Normal rate.   Musculoskeletal:      Comments: Right 2nd digit amputation eschar at the tip     Left great toe amputation with some eschar of the base of the amputation site   Skin:     General: Skin is warm.      Capillary Refill: Capillary refill takes less than 2 seconds.   Neurological:      General: No focal deficit present.      Mental Status: He is alert.   Psychiatric:         Mood and Affect: Mood normal.         Behavior: Behavior normal.         Thought Content: Thought content normal.         Judgment: Judgment normal.           Assessment:       1. PVD (peripheral vascular disease)        Plan:       ABIs bilaterally

## 2025-02-26 ENCOUNTER — HOSPITAL ENCOUNTER (OUTPATIENT)
Dept: RADIOLOGY | Facility: HOSPITAL | Age: 70
Discharge: HOME | End: 2025-02-26
Attending: SURGERY
Payer: MEDICARE

## 2025-02-26 DIAGNOSIS — I73.9 PVD (PERIPHERAL VASCULAR DISEASE): ICD-10-CM

## 2025-02-26 PROCEDURE — 93923 UPR/LXTR ART STDY 3+ LVLS: CPT | Mod: TC

## 2025-02-26 PROCEDURE — 93923 UPR/LXTR ART STDY 3+ LVLS: CPT | Mod: 26,,, | Performed by: SURGERY

## 2025-02-28 ENCOUNTER — TELEPHONE (OUTPATIENT)
Dept: SURGERY | Facility: CLINIC | Age: 70
End: 2025-02-28
Payer: MEDICARE

## 2025-02-28 NOTE — TELEPHONE ENCOUNTER
----- Message from Ivana sent at 2/28/2025 10:47 AM CST -----  Regarding: asking for a call back for test results  Who Called:  Dereck (wife)Preferred Method of Contact: Phone CallBest Call Back Number, if different: [phone] Information:  asking for test results from 02/26    Informed pt wife that I will send Dr. Hernandes's nurse a message about test results Monday morning when she returns, informed pt wife to reach out to our office if she has not heard back from us by Tuesday.

## 2025-06-26 ENCOUNTER — LAB REQUISITION (OUTPATIENT)
Dept: LAB | Facility: HOSPITAL | Age: 70
End: 2025-06-26
Attending: FAMILY MEDICINE
Payer: MEDICARE

## 2025-06-26 DIAGNOSIS — M86.179 OTHER ACUTE OSTEOMYELITIS, UNSPECIFIED ANKLE AND FOOT: ICD-10-CM

## 2025-06-26 LAB
ALBUMIN SERPL BCP-MCNC: 1.5 G/DL (ref 3.4–4.8)
ALBUMIN/GLOB SERPL: 0.5 {RATIO}
ALP SERPL-CCNC: 113 U/L (ref 40–150)
ALT SERPL W P-5'-P-CCNC: 17 U/L
ANION GAP SERPL CALCULATED.3IONS-SCNC: 14 MMOL/L (ref 7–16)
AST SERPL W P-5'-P-CCNC: 38 U/L (ref 11–45)
BASOPHILS # BLD AUTO: 0.04 K/UL (ref 0–0.2)
BASOPHILS NFR BLD AUTO: 0.5 % (ref 0–1)
BILIRUB SERPL-MCNC: 0.3 MG/DL
BUN SERPL-MCNC: 44 MG/DL (ref 8–26)
BUN/CREAT SERPL: 6 (ref 6–20)
CALCIUM SERPL-MCNC: 7.7 MG/DL (ref 8.8–10)
CHLORIDE SERPL-SCNC: 99 MMOL/L (ref 98–107)
CK SERPL-CCNC: 25 U/L (ref 30–200)
CO2 SERPL-SCNC: 29 MMOL/L (ref 23–31)
CREAT SERPL-MCNC: 7.61 MG/DL (ref 0.72–1.25)
DIFFERENTIAL METHOD BLD: ABNORMAL
EGFR (NO RACE VARIABLE) (RUSH/TITUS): 7 ML/MIN/1.73M2
EOSINOPHIL # BLD AUTO: 1.01 K/UL (ref 0–0.5)
EOSINOPHIL NFR BLD AUTO: 11.8 % (ref 1–4)
ERYTHROCYTE [DISTWIDTH] IN BLOOD BY AUTOMATED COUNT: 16.4 % (ref 11.5–14.5)
GLOBULIN SER-MCNC: 2.9 G/DL (ref 2–4)
GLUCOSE SERPL-MCNC: 288 MG/DL (ref 82–115)
HCT VFR BLD AUTO: 34.1 % (ref 40–54)
HGB BLD-MCNC: 10.9 G/DL (ref 13.5–18)
IMM GRANULOCYTES # BLD AUTO: 0.04 K/UL (ref 0–0.04)
IMM GRANULOCYTES NFR BLD: 0.5 % (ref 0–0.4)
LYMPHOCYTES # BLD AUTO: 1.75 K/UL (ref 1–4.8)
LYMPHOCYTES NFR BLD AUTO: 20.5 % (ref 27–41)
MCH RBC QN AUTO: 30.2 PG (ref 27–31)
MCHC RBC AUTO-ENTMCNC: 32 G/DL (ref 32–36)
MCV RBC AUTO: 94.5 FL (ref 80–96)
MONOCYTES # BLD AUTO: 0.48 K/UL (ref 0–0.8)
MONOCYTES NFR BLD AUTO: 5.6 % (ref 2–6)
MPC BLD CALC-MCNC: 9.1 FL (ref 9.4–12.4)
NEUTROPHILS # BLD AUTO: 5.22 K/UL (ref 1.8–7.7)
NEUTROPHILS NFR BLD AUTO: 61.1 % (ref 53–65)
NRBC # BLD AUTO: 0 X10E3/UL
NRBC, AUTO (.00): 0 %
PLATELET # BLD AUTO: 163 K/UL (ref 150–400)
POTASSIUM SERPL-SCNC: 3.6 MMOL/L (ref 3.5–5.1)
PROT SERPL-MCNC: 4.4 G/DL (ref 5.8–7.6)
RBC # BLD AUTO: 3.61 M/UL (ref 4.6–6.2)
SODIUM SERPL-SCNC: 138 MMOL/L (ref 136–145)
WBC # BLD AUTO: 8.54 K/UL (ref 4.5–11)

## 2025-06-26 PROCEDURE — 85025 COMPLETE CBC W/AUTO DIFF WBC: CPT | Performed by: FAMILY MEDICINE

## 2025-06-26 PROCEDURE — 80053 COMPREHEN METABOLIC PANEL: CPT | Performed by: FAMILY MEDICINE

## 2025-06-26 PROCEDURE — 82550 ASSAY OF CK (CPK): CPT | Performed by: FAMILY MEDICINE

## 2025-07-01 ENCOUNTER — LAB REQUISITION (OUTPATIENT)
Dept: LAB | Facility: HOSPITAL | Age: 70
End: 2025-07-01
Attending: FAMILY MEDICINE
Payer: MEDICARE

## 2025-07-01 DIAGNOSIS — L03.115 CELLULITIS OF RIGHT LOWER LIMB: ICD-10-CM

## 2025-07-01 DIAGNOSIS — L03.116 CELLULITIS OF LEFT LOWER LIMB: ICD-10-CM

## 2025-07-01 LAB
ALBUMIN SERPL BCP-MCNC: 1.7 G/DL (ref 3.4–4.8)
ALBUMIN/GLOB SERPL: 0.5 {RATIO}
ALP SERPL-CCNC: 113 U/L (ref 40–150)
ALT SERPL W P-5'-P-CCNC: 13 U/L
ANION GAP SERPL CALCULATED.3IONS-SCNC: 14 MMOL/L (ref 7–16)
AST SERPL W P-5'-P-CCNC: 31 U/L (ref 11–45)
BASOPHILS # BLD AUTO: 0.07 K/UL (ref 0–0.2)
BASOPHILS NFR BLD AUTO: 0.8 % (ref 0–1)
BILIRUB SERPL-MCNC: 0.4 MG/DL
BUN SERPL-MCNC: 35 MG/DL (ref 8–26)
BUN/CREAT SERPL: 5 (ref 6–20)
CALCIUM SERPL-MCNC: 8.5 MG/DL (ref 8.8–10)
CHLORIDE SERPL-SCNC: 100 MMOL/L (ref 98–107)
CK SERPL-CCNC: 88 U/L (ref 30–200)
CO2 SERPL-SCNC: 31 MMOL/L (ref 23–31)
CREAT SERPL-MCNC: 6.56 MG/DL (ref 0.72–1.25)
DIFFERENTIAL METHOD BLD: ABNORMAL
EGFR (NO RACE VARIABLE) (RUSH/TITUS): 9 ML/MIN/1.73M2
EOSINOPHIL # BLD AUTO: 1.38 K/UL (ref 0–0.5)
EOSINOPHIL NFR BLD AUTO: 15.1 % (ref 1–4)
ERYTHROCYTE [DISTWIDTH] IN BLOOD BY AUTOMATED COUNT: 15.7 % (ref 11.5–14.5)
GLOBULIN SER-MCNC: 3.5 G/DL (ref 2–4)
GLUCOSE SERPL-MCNC: 169 MG/DL (ref 82–115)
HCT VFR BLD AUTO: 36.1 % (ref 40–54)
HGB BLD-MCNC: 11.5 G/DL (ref 13.5–18)
IMM GRANULOCYTES # BLD AUTO: 0.07 K/UL (ref 0–0.04)
IMM GRANULOCYTES NFR BLD: 0.8 % (ref 0–0.4)
LYMPHOCYTES # BLD AUTO: 1.96 K/UL (ref 1–4.8)
LYMPHOCYTES NFR BLD AUTO: 21.4 % (ref 27–41)
MCH RBC QN AUTO: 30.1 PG (ref 27–31)
MCHC RBC AUTO-ENTMCNC: 31.9 G/DL (ref 32–36)
MCV RBC AUTO: 94.5 FL (ref 80–96)
MONOCYTES # BLD AUTO: 0.51 K/UL (ref 0–0.8)
MONOCYTES NFR BLD AUTO: 5.6 % (ref 2–6)
MPC BLD CALC-MCNC: 9 FL (ref 9.4–12.4)
NEUTROPHILS # BLD AUTO: 5.17 K/UL (ref 1.8–7.7)
NEUTROPHILS NFR BLD AUTO: 56.3 % (ref 53–65)
NRBC # BLD AUTO: 0 X10E3/UL
NRBC, AUTO (.00): 0 %
PLATELET # BLD AUTO: 187 K/UL (ref 150–400)
POTASSIUM SERPL-SCNC: 3.5 MMOL/L (ref 3.5–5.1)
PROT SERPL-MCNC: 5.2 G/DL (ref 5.8–7.6)
RBC # BLD AUTO: 3.82 M/UL (ref 4.6–6.2)
SODIUM SERPL-SCNC: 141 MMOL/L (ref 136–145)
WBC # BLD AUTO: 9.16 K/UL (ref 4.5–11)

## 2025-07-01 PROCEDURE — 85025 COMPLETE CBC W/AUTO DIFF WBC: CPT | Performed by: FAMILY MEDICINE

## 2025-07-01 PROCEDURE — 80053 COMPREHEN METABOLIC PANEL: CPT | Performed by: FAMILY MEDICINE

## 2025-07-01 PROCEDURE — 82550 ASSAY OF CK (CPK): CPT | Performed by: FAMILY MEDICINE

## 2025-07-08 ENCOUNTER — LAB REQUISITION (OUTPATIENT)
Dept: LAB | Facility: HOSPITAL | Age: 70
End: 2025-07-08
Attending: NURSE PRACTITIONER
Payer: MEDICARE

## 2025-07-08 DIAGNOSIS — L03.115 CELLULITIS OF RIGHT LOWER LIMB: ICD-10-CM

## 2025-07-08 DIAGNOSIS — L03.116 CELLULITIS OF LEFT LOWER LIMB: ICD-10-CM

## 2025-07-08 LAB
ALBUMIN SERPL BCP-MCNC: 1.6 G/DL (ref 3.4–4.8)
ALBUMIN/GLOB SERPL: 0.4 {RATIO}
ALP SERPL-CCNC: 113 U/L (ref 40–150)
ALT SERPL W P-5'-P-CCNC: 9 U/L
ANION GAP SERPL CALCULATED.3IONS-SCNC: 13 MMOL/L (ref 7–16)
AST SERPL W P-5'-P-CCNC: 29 U/L (ref 11–45)
BASOPHILS # BLD AUTO: 0.12 K/UL (ref 0–0.2)
BASOPHILS NFR BLD AUTO: 1.3 % (ref 0–1)
BILIRUB SERPL-MCNC: 0.3 MG/DL
BUN SERPL-MCNC: 29 MG/DL (ref 8–26)
BUN/CREAT SERPL: 5 (ref 6–20)
CALCIUM SERPL-MCNC: 8.5 MG/DL (ref 8.8–10)
CHLORIDE SERPL-SCNC: 102 MMOL/L (ref 98–107)
CK SERPL-CCNC: 78 U/L (ref 30–200)
CO2 SERPL-SCNC: 31 MMOL/L (ref 23–31)
CREAT SERPL-MCNC: 6.36 MG/DL (ref 0.72–1.25)
DIFFERENTIAL METHOD BLD: ABNORMAL
EGFR (NO RACE VARIABLE) (RUSH/TITUS): 9 ML/MIN/1.73M2
EOSINOPHIL # BLD AUTO: 1.38 K/UL (ref 0–0.5)
EOSINOPHIL NFR BLD AUTO: 14.7 % (ref 1–4)
ERYTHROCYTE [DISTWIDTH] IN BLOOD BY AUTOMATED COUNT: 14.9 % (ref 11.5–14.5)
GLOBULIN SER-MCNC: 3.7 G/DL (ref 2–4)
GLUCOSE SERPL-MCNC: 199 MG/DL (ref 82–115)
HCT VFR BLD AUTO: 36.9 % (ref 40–54)
HGB BLD-MCNC: 11.8 G/DL (ref 13.5–18)
IMM GRANULOCYTES # BLD AUTO: 0.06 K/UL (ref 0–0.04)
IMM GRANULOCYTES NFR BLD: 0.6 % (ref 0–0.4)
LYMPHOCYTES # BLD AUTO: 1.79 K/UL (ref 1–4.8)
LYMPHOCYTES NFR BLD AUTO: 19.1 % (ref 27–41)
MCH RBC QN AUTO: 30.4 PG (ref 27–31)
MCHC RBC AUTO-ENTMCNC: 32 G/DL (ref 32–36)
MCV RBC AUTO: 95.1 FL (ref 80–96)
MONOCYTES # BLD AUTO: 0.52 K/UL (ref 0–0.8)
MONOCYTES NFR BLD AUTO: 5.6 % (ref 2–6)
MPC BLD CALC-MCNC: 8.8 FL (ref 9.4–12.4)
NEUTROPHILS # BLD AUTO: 5.49 K/UL (ref 1.8–7.7)
NEUTROPHILS NFR BLD AUTO: 58.7 % (ref 53–65)
NRBC # BLD AUTO: 0 X10E3/UL
NRBC, AUTO (.00): 0 %
PLATELET # BLD AUTO: 208 K/UL (ref 150–400)
POTASSIUM SERPL-SCNC: 4.2 MMOL/L (ref 3.5–5.1)
PROT SERPL-MCNC: 5.3 G/DL (ref 5.8–7.6)
RBC # BLD AUTO: 3.88 M/UL (ref 4.6–6.2)
SODIUM SERPL-SCNC: 142 MMOL/L (ref 136–145)
WBC # BLD AUTO: 9.36 K/UL (ref 4.5–11)

## 2025-07-08 PROCEDURE — 82550 ASSAY OF CK (CPK): CPT | Performed by: NURSE PRACTITIONER

## 2025-07-08 PROCEDURE — 80053 COMPREHEN METABOLIC PANEL: CPT | Performed by: NURSE PRACTITIONER

## 2025-07-08 PROCEDURE — 85025 COMPLETE CBC W/AUTO DIFF WBC: CPT | Performed by: NURSE PRACTITIONER

## 2025-07-19 ENCOUNTER — HOSPITAL ENCOUNTER (EMERGENCY)
Facility: HOSPITAL | Age: 70
Discharge: HOME | End: 2025-07-19
Payer: MEDICARE

## 2025-07-19 VITALS
TEMPERATURE: 98 F | WEIGHT: 200 LBS | RESPIRATION RATE: 18 BRPM | OXYGEN SATURATION: 100 % | HEIGHT: 69 IN | HEART RATE: 74 BPM | BODY MASS INDEX: 29.62 KG/M2 | SYSTOLIC BLOOD PRESSURE: 139 MMHG | DIASTOLIC BLOOD PRESSURE: 50 MMHG

## 2025-07-19 DIAGNOSIS — J20.9 ACUTE BRONCHITIS, UNSPECIFIED ORGANISM: Primary | ICD-10-CM

## 2025-07-19 DIAGNOSIS — R05.9 COUGH: ICD-10-CM

## 2025-07-19 DIAGNOSIS — R50.9 LOW GRADE FEVER: ICD-10-CM

## 2025-07-19 DIAGNOSIS — R50.9 FEVER: ICD-10-CM

## 2025-07-19 LAB
ALBUMIN SERPL BCP-MCNC: 1.9 G/DL (ref 3.4–4.8)
ALBUMIN/GLOB SERPL: 0.5 {RATIO}
ALP SERPL-CCNC: 112 U/L (ref 40–150)
ALT SERPL W P-5'-P-CCNC: 11 U/L
ANION GAP SERPL CALCULATED.3IONS-SCNC: 13 MMOL/L (ref 7–16)
AST SERPL W P-5'-P-CCNC: 22 U/L (ref 11–45)
BASOPHILS # BLD AUTO: 0.13 K/UL (ref 0–0.2)
BASOPHILS NFR BLD AUTO: 1.3 % (ref 0–1)
BILIRUB SERPL-MCNC: 0.5 MG/DL
BUN SERPL-MCNC: 40 MG/DL (ref 8–26)
BUN/CREAT SERPL: 6 (ref 6–20)
CALCIUM SERPL-MCNC: 8.9 MG/DL (ref 8.8–10)
CHLORIDE SERPL-SCNC: 105 MMOL/L (ref 98–107)
CO2 SERPL-SCNC: 29 MMOL/L (ref 23–31)
CREAT SERPL-MCNC: 7.15 MG/DL (ref 0.72–1.25)
DIFFERENTIAL METHOD BLD: ABNORMAL
EGFR (NO RACE VARIABLE) (RUSH/TITUS): 8 ML/MIN/1.73M2
EOSINOPHIL # BLD AUTO: 1.36 K/UL (ref 0–0.5)
EOSINOPHIL NFR BLD AUTO: 13.2 % (ref 1–4)
ERYTHROCYTE [DISTWIDTH] IN BLOOD BY AUTOMATED COUNT: 14.9 % (ref 11.5–14.5)
GLOBULIN SER-MCNC: 3.6 G/DL (ref 2–4)
GLUCOSE SERPL-MCNC: 178 MG/DL (ref 82–115)
HCT VFR BLD AUTO: 36.4 % (ref 40–54)
HGB BLD-MCNC: 11.8 G/DL (ref 13.5–18)
IMM GRANULOCYTES # BLD AUTO: 0.09 K/UL (ref 0–0.04)
IMM GRANULOCYTES NFR BLD: 0.9 % (ref 0–0.4)
LACTATE SERPL-SCNC: 0.7 MMOL/L (ref 0.5–2.2)
LYMPHOCYTES # BLD AUTO: 2.05 K/UL (ref 1–4.8)
LYMPHOCYTES NFR BLD AUTO: 19.9 % (ref 27–41)
MCH RBC QN AUTO: 30.6 PG (ref 27–31)
MCHC RBC AUTO-ENTMCNC: 32.4 G/DL (ref 32–36)
MCV RBC AUTO: 94.3 FL (ref 80–96)
MONOCYTES # BLD AUTO: 0.64 K/UL (ref 0–0.8)
MONOCYTES NFR BLD AUTO: 6.2 % (ref 2–6)
MPC BLD CALC-MCNC: 8.6 FL (ref 9.4–12.4)
NEUTROPHILS # BLD AUTO: 6.04 K/UL (ref 1.8–7.7)
NEUTROPHILS NFR BLD AUTO: 58.5 % (ref 53–65)
NRBC # BLD AUTO: 0 X10E3/UL
NRBC, AUTO (.00): 0 %
PLATELET # BLD AUTO: 168 K/UL (ref 150–400)
POTASSIUM SERPL-SCNC: 4.6 MMOL/L (ref 3.5–5.1)
PROT SERPL-MCNC: 5.5 G/DL (ref 5.8–7.6)
RBC # BLD AUTO: 3.86 M/UL (ref 4.6–6.2)
SODIUM SERPL-SCNC: 142 MMOL/L (ref 136–145)
WBC # BLD AUTO: 10.31 K/UL (ref 4.5–11)

## 2025-07-19 PROCEDURE — 99284 EMERGENCY DEPT VISIT MOD MDM: CPT | Mod: 25

## 2025-07-19 PROCEDURE — 83605 ASSAY OF LACTIC ACID: CPT | Performed by: NURSE PRACTITIONER

## 2025-07-19 PROCEDURE — 36415 COLL VENOUS BLD VENIPUNCTURE: CPT | Performed by: NURSE PRACTITIONER

## 2025-07-19 PROCEDURE — 80053 COMPREHEN METABOLIC PANEL: CPT | Performed by: NURSE PRACTITIONER

## 2025-07-19 PROCEDURE — 99284 EMERGENCY DEPT VISIT MOD MDM: CPT | Mod: GF | Performed by: NURSE PRACTITIONER

## 2025-07-19 PROCEDURE — 87040 BLOOD CULTURE FOR BACTERIA: CPT | Performed by: NURSE PRACTITIONER

## 2025-07-19 PROCEDURE — 85025 COMPLETE CBC W/AUTO DIFF WBC: CPT | Performed by: NURSE PRACTITIONER

## 2025-07-19 RX ORDER — ALBUTEROL SULFATE 90 UG/1
1-2 INHALANT RESPIRATORY (INHALATION) EVERY 4 HOURS PRN
Qty: 18 G | Refills: 0 | Status: SHIPPED | OUTPATIENT
Start: 2025-07-19

## 2025-07-19 RX ORDER — AZITHROMYCIN 250 MG/1
TABLET, FILM COATED ORAL
Qty: 6 TABLET | Refills: 0 | Status: SHIPPED | OUTPATIENT
Start: 2025-07-19 | End: 2025-07-24

## 2025-07-19 RX ORDER — AMOXICILLIN AND CLAVULANATE POTASSIUM 500; 125 MG/1; MG/1
1 TABLET, FILM COATED ORAL 2 TIMES DAILY
Qty: 14 TABLET | Refills: 0 | Status: SHIPPED | OUTPATIENT
Start: 2025-07-19 | End: 2025-07-26

## 2025-07-19 RX ORDER — PANTOPRAZOLE SODIUM 40 MG/1
40 TABLET, DELAYED RELEASE ORAL
COMMUNITY
Start: 2025-06-24 | End: 2025-07-24

## 2025-07-19 NOTE — ED PROVIDER NOTES
"Encounter Date: 7/19/2025       History     Chief Complaint   Patient presents with    Cough    Fever    Nasal Congestion     Pt presents to the ER for nonproductive cough for 2 weeks and temperature of 99.1 this morning.  Pt reports his wife made him come.  Reports intermittent episodes of wheezing, denies now.  Denies SOB.  Pt has extensive medical history to include DM, MI, HTN, CVA, ESRD on PD, CAD, CHF, PAD. He currently has wound vac to left foot from recent surgery for toe amputation and osteomyelitis.  Pt just completed 4-6 weeks of IV antibiotics.  He saw Dr Lamas at [facility] and had repeat MRI of the lia feet on 7/16/25, which showed   Left:Overall similar appearance of the left foot when compared to study dated May 29, 2025. Amputation changes as detailed.There is mild edema within the distal aspects of the second and third metatarsals as well as the distal amputation site of the first metatarsal which appears similar to prior which may be reactive or reflect early osteomyelitis. There is no definitive abnormal T1 signal to confirm osteomyelitis. Soft tissue edema.   Right: Motion limited exam. Amputation changes of the foot again noted.No convincing osseous marrow changes to suggest acute osteomyelitis. Soft tissue edema.     He reports Dr Mendes nurse called him to report he no longer has infection in the bone, and they were setting him up for a skin graft next Friday.    He saw wound care on 7/14/25 who documented "Left foot wound remains pink with granulation and scattered slough." Pt reports no pain to either foot.  Pt reports he doesn't make urine often due to ESRD, and he would not be able to provide a sample.      The history is provided by the patient.     Review of patient's allergies indicates:   Allergen Reactions    Brilinta [ticagrelor] Nausea And Vomiting    Demerol [meperidine] Nausea And Vomiting    Iodinated contrast media Rash     Other reaction(s): Unknown    Sulfa (sulfonamide " antibiotics) Rash     Cold sweats    Sulfamethoxazole-trimethoprim Rash     Past Medical History:   Diagnosis Date    Diabetes mellitus     GERD (gastroesophageal reflux disease)     Heart attack     Hypertension     Stroke      Past Surgical History:   Procedure Laterality Date    knee scope Bilateral     MYRINGOTOMY WITH INSERTION OF VENTILATION TUBE Right 6/17/2022    Procedure: RIGHT MYRINGOTOMY, WITH TYMPANOSTOMY TUBE INSERTION;  Surgeon: Rufus Lynn MD;  Location: [facility] [place];  Service: ENT;  Laterality: Right;    NECK SURGERY       No family history on file.  Social History[1]  Review of Systems   Constitutional:  Positive for fever (99.1).   HENT:  Negative for sore throat.    Respiratory:  Positive for cough and wheezing. Negative for shortness of breath.    Cardiovascular:  Negative for chest pain.   Gastrointestinal:  Negative for nausea.   Genitourinary:  Negative for dysuria.   Musculoskeletal:  Negative for back pain.   Skin:  Positive for wound. Negative for rash.   Neurological:  Negative for weakness.   Hematological:  Does not bruise/bleed easily.       Physical Exam     Initial Vitals [07/19/25 1248]   BP Pulse Resp Temp SpO2   (!) 162/57 79 18 97.5 °F (36.4 °C) 99 %      MAP       --         Physical Exam    Nursing note and vitals reviewed.  Constitutional: He appears well-developed and well-nourished. He is not diaphoretic. He is cooperative.  Non-toxic appearance. No distress. He is not intubated.   Chronic ill appearing   HENT:   Head: Normocephalic and atraumatic.   Eyes: Pupils are equal, round, and reactive to light.   Neck: Neck supple.   Cardiovascular:  Normal rate, regular rhythm, normal heart sounds and intact distal pulses.     Exam reveals no gallop and no friction rub.       No murmur heard.  Pulmonary/Chest: No accessory muscle usage. No apnea, no tachypnea and no bradypnea. He is not intubated. No respiratory distress. He has no decreased breath sounds. He has no wheezes.  He has rhonchi in the left middle field. He has no rales. He exhibits no tenderness.   Musculoskeletal:      Cervical back: Neck supple.        Legs:      Neurological: He is alert and oriented to person, place, and time. GCS score is 15. GCS eye subscore is 4. GCS verbal subscore is 5. GCS motor subscore is 6.   Skin: Skin is warm and dry. Capillary refill takes less than 2 seconds.   Psychiatric: He has a normal mood and affect.         Medical Screening Exam   See Full Note    ED Course   Procedures  Labs Reviewed   COMPREHENSIVE METABOLIC PANEL - Abnormal       Result Value    Sodium 142      Potassium 4.6      Chloride 105      CO2 29      Anion Gap 13      Glucose 178 (*)     BUN 40 (*)     Creatinine 7.15 (*)     BUN/Creatinine Ratio 6      Calcium 8.9      Total Protein 5.5 (*)     Albumin 1.9 (*)     Globulin 3.6      A/G Ratio 0.5      Bilirubin, Total 0.5      Alk Phos 112      ALT 11      AST 22      eGFR 8 (*)    CBC WITH DIFFERENTIAL - Abnormal    WBC 10.31      RBC 3.86 (*)     Hemoglobin 11.8 (*)     Hematocrit 36.4 (*)     MCV 94.3      MCH 30.6      MCHC 32.4      RDW 14.9 (*)     Platelet Count 168      MPV 8.6 (*)     Neutrophils % 58.5      Lymphocytes % 19.9 (*)     Monocytes % 6.2 (*)     Eosinophils % 13.2 (*)     Basophils % 1.3 (*)     Immature Granulocytes % 0.9 (*)     nRBC, Auto 0.0      Neutrophils, Abs 6.04      Lymphocytes, Absolute 2.05      Monocytes, Absolute 0.64      Eosinophils, Absolute 1.36 (*)     Basophils, Absolute 0.13      Immature Granulocytes, Absolute 0.09 (*)     nRBC, Absolute 0.00      Diff Type Auto     LACTIC ACID, PLASMA - Normal    Lactic Acid 0.7     CULTURE, BLOOD   CULTURE, BLOOD   CBC W/ AUTO DIFFERENTIAL    Narrative:     The following orders were created for panel order CBC auto differential.  Procedure                               Abnormality         Status                     ---------                               -----------         ------                      CBC with Differential[[id number]]       Abnormal            Final result                 Please view results for these tests on the individual orders.          Imaging Results              X-Ray Chest PA And Lateral (Final result)  Result time 07/19/25 13:33:42      Final result by Ulises Kauffman MD (07/19/25 13:33:42)                   Impression:      1. Focus of increased parenchymal attenuation projects over the right lower lung zone, this may reflect artifact from nipple shadow.  Developing focus of consolidation is not excluded.  A similar focus was seen on prior CT, and may reflect residual change from that process.  Correlation and follow-up is advised.      Electronically signed by: Ulises Kauffman MD  Date:    07/19/2025  Time:    13:33               Narrative:    EXAMINATION:  XR CHEST PA AND LATERAL    CLINICAL HISTORY:  Cough, unspecified    TECHNIQUE:  PA and lateral views of the chest were performed.    COMPARISON:  CT chest 12/20/2023    FINDINGS:  Right PICC catheter tip projects potentially at the level of the right subclavian although cannot be followed in its entirety.  Correlation is advised.  The cardiomediastinal silhouette is not enlarged.  There is no pleural effusion.  The trachea is midline.  The lungs are symmetrically expanded bilaterally with mildly coarse interstitial attenuation.  There is a focus of increased parenchymal attenuation projected over the right lower lung zone, possibly related to nipple shadow.  There is no pneumothorax.  The osseous structures are remarkable for degenerative change..  Surgical change noted of the proximal left humerus.                                       Medications - No data to display  Medical Decision Making  Problems Addressed:  Acute bronchitis, unspecified organism: acute illness or injury  Cough: acute illness or injury  Fever: acute illness or injury  Low grade fever: acute illness or injury    Amount and/or Complexity of Data  Reviewed  External Data Reviewed: labs, radiology and notes.  Labs: ordered. Decision-making details documented in ED Course.  Radiology: ordered. Decision-making details documented in ED Course.    Risk  Prescription drug management.               ED Course as of 07/19/25 1412   Sat Jul 19, 2025   1252 Temp: 97.5 °F (36.4 °C) [AG]   1252 SpO2: 99 % [AG]   1320 Discussed COVID/Flu swabs with patient, he doesn't want those done.  [AG]   1325 WBC: 10.31 [AG]   1337 CXR shows:Focus of increased parenchymal attenuation projects over the right lower lung zone, this may reflect artifact from nipple shadow.  Developing focus of consolidation is not excluded.  A similar focus was seen on prior CT, and may reflect residual change from that process.  Correlation and follow-up is advised.    With him having the cough and lower grade fever, will treat this as an early PNA/vs acute bronchitis and have him follow-up with his PCP. I also sent him in an albuterol inhaler for his wheezing he has occasionally.  I discussed with him the risk of developing c-diff from the antibiotics.   [AG]   1350 BUN(!): 40 [AG]   1350 Creatinine(!): 7.15 [AG]   1350 eGFR(!): 8 [AG]   1350 Lactic Acid Level: 0.7 [AG]      ED Course User Index  [AG] Damaris Elizondo FNP                           Clinical Impression:   Final diagnoses:  [R05.9] Cough  [R50.9] Fever  [J20.9] Acute bronchitis, unspecified organism (Primary)  [R50.9] Low grade fever        ED Disposition Condition    Discharge Stable          ED Prescriptions       Medication Sig Dispense Start Date End Date Auth. Provider    amoxicillin-clavulanate 500-125mg (AUGMENTIN) 500-125 mg Tab Take 1 tablet (500 mg total) by mouth 2 (two) times daily. for 7 days 14 tablet 7/19/2025 7/26/2025 Damaris Elizondo FNP    azithromycin (Z-ERIKA) 250 MG tablet Take 2 tablets by mouth on day 1; Take 1 tablet by mouth on days 2-5 6 tablet 7/19/2025 7/24/2025 Damaris Elizondo FNP    albuterol (PROVENTIL/VENTOLIN  HFA) 90 mcg/actuation inhaler Inhale 1-2 puffs into the lungs every 4 (four) hours as needed for Wheezing or Shortness of Breath. Rescue 18 g 7/19/2025 -- Damaris Elizondo FNP          Follow-up Information       Follow up With Specialties Details Why Contact Info    Your family doctor  Call in 2 days                 Damaris Elizondo FNP  07/19/25 1411       [1]   Social History  Tobacco Use    Smoking status: Never    Smokeless tobacco: Never   Vaping Use    Vaping status: Never Used   Substance Use Topics    Alcohol use: Never    Drug use: Never        Damaris Elizondo FNP  07/19/25 1412

## 2025-07-19 NOTE — DISCHARGE INSTRUCTIONS
Call your family doctor to have them look at your previous xrays you've had done at [facility].  Since they have epi, they will be able to see our report and compare to theirs.  I have sent you in antibiotics to cover a possible early developing pneumonia.  Please return to the ER for any new or worsening of your symptoms.

## 2025-07-19 NOTE — ED TRIAGE NOTES
Has had a cough for a couple of weeks and has had a slight fever the past 2 days, no fever on arrival, has not taken anything for fever today

## 2025-07-26 LAB
BACTERIA BLD CULT: NORMAL
BACTERIA BLD CULT: NORMAL

## (undated) DEVICE — TUBING SUCTION 5MM STERILE 3/16IN X 12FT

## (undated) DEVICE — Device

## (undated) DEVICE — BLADE MYRINGOTOMY 7120

## (undated) DEVICE — GLOVE SURGICAL PROTEXIS PI SIZE 7.5

## (undated) DEVICE — BALL COTTON STERILE ENT